# Patient Record
Sex: MALE | Race: WHITE | Employment: OTHER | URBAN - NONMETROPOLITAN AREA
[De-identification: names, ages, dates, MRNs, and addresses within clinical notes are randomized per-mention and may not be internally consistent; named-entity substitution may affect disease eponyms.]

---

## 2022-01-27 ENCOUNTER — INITIAL CONSULT (OUTPATIENT)
Dept: NEUROLOGY | Age: 77
End: 2022-01-27
Payer: MEDICARE

## 2022-01-27 VITALS
SYSTOLIC BLOOD PRESSURE: 128 MMHG | WEIGHT: 250 LBS | HEART RATE: 94 BPM | HEIGHT: 68 IN | BODY MASS INDEX: 37.89 KG/M2 | DIASTOLIC BLOOD PRESSURE: 72 MMHG

## 2022-01-27 DIAGNOSIS — M79.672 FOOT PAIN, BILATERAL: ICD-10-CM

## 2022-01-27 DIAGNOSIS — R20.2 NUMBNESS AND TINGLING OF BOTH FEET: Primary | ICD-10-CM

## 2022-01-27 DIAGNOSIS — M79.671 FOOT PAIN, BILATERAL: ICD-10-CM

## 2022-01-27 DIAGNOSIS — R20.0 NUMBNESS AND TINGLING OF BOTH FEET: Primary | ICD-10-CM

## 2022-01-27 PROCEDURE — 4040F PNEUMOC VAC/ADMIN/RCVD: CPT | Performed by: PSYCHIATRY & NEUROLOGY

## 2022-01-27 PROCEDURE — 99205 OFFICE O/P NEW HI 60 MIN: CPT | Performed by: PSYCHIATRY & NEUROLOGY

## 2022-01-27 PROCEDURE — G8427 DOCREV CUR MEDS BY ELIG CLIN: HCPCS | Performed by: PSYCHIATRY & NEUROLOGY

## 2022-01-27 PROCEDURE — 1036F TOBACCO NON-USER: CPT | Performed by: PSYCHIATRY & NEUROLOGY

## 2022-01-27 PROCEDURE — 3017F COLORECTAL CA SCREEN DOC REV: CPT | Performed by: PSYCHIATRY & NEUROLOGY

## 2022-01-27 PROCEDURE — G8484 FLU IMMUNIZE NO ADMIN: HCPCS | Performed by: PSYCHIATRY & NEUROLOGY

## 2022-01-27 PROCEDURE — 1123F ACP DISCUSS/DSCN MKR DOCD: CPT | Performed by: PSYCHIATRY & NEUROLOGY

## 2022-01-27 PROCEDURE — G8417 CALC BMI ABV UP PARAM F/U: HCPCS | Performed by: PSYCHIATRY & NEUROLOGY

## 2022-01-27 RX ORDER — EMPAGLIFLOZIN 25 MG/1
25 TABLET, FILM COATED ORAL DAILY
COMMUNITY

## 2022-01-27 NOTE — PATIENT INSTRUCTIONS
1. Follow up with your PCP to treat edema prior to EMG study  2. EMG bilateral lower extremitites  3. Vitamin B12, folate  4. Vitamin B6 level  5. Will order connective tissue screen including GA, rheumatoid factor, antidouble-stranded DNA, anti-SSA, anti-SSB in addition to  and sed rate. 6. Call with any new symptoms or concerns. 7. Follow up in 6 weeks.

## 2022-02-02 ENCOUNTER — TELEPHONE (OUTPATIENT)
Dept: NEUROLOGY | Age: 77
End: 2022-02-02

## 2022-02-02 NOTE — TELEPHONE ENCOUNTER
----- Message from TAI Mcarthur CNP sent at 2/2/2022 11:07 AM EST -----  Please let patient know his vitamin B12 level is marginally low=368. He needs to start on vitamin B12 sublingual supplements, at least 3000 mcg daily, over the counter  Please have him call the office in 2-3 months to obtain repeat vitamin B12 level. His rheumatoid factor is also elevated=16. He needs to be evaluated by rheumatology. Does he have a preference?   Per Dutta CNP

## 2022-02-02 NOTE — TELEPHONE ENCOUNTER
Patient notified and verbalized understanding. Patient stated he will call office back with Rheumatology preference.

## 2022-02-11 ENCOUNTER — TELEPHONE (OUTPATIENT)
Dept: NEUROLOGY | Age: 77
End: 2022-02-11

## 2022-02-11 DIAGNOSIS — M79.672 FOOT PAIN, BILATERAL: ICD-10-CM

## 2022-02-11 DIAGNOSIS — R20.2 NUMBNESS AND TINGLING OF BOTH FEET: ICD-10-CM

## 2022-02-11 DIAGNOSIS — M79.671 FOOT PAIN, BILATERAL: ICD-10-CM

## 2022-02-11 DIAGNOSIS — R89.9 ABNORMAL LABORATORY TEST: Primary | ICD-10-CM

## 2022-02-11 DIAGNOSIS — R20.0 NUMBNESS AND TINGLING OF BOTH FEET: ICD-10-CM

## 2022-02-11 NOTE — TELEPHONE ENCOUNTER
Patient called back requesting referral to Dr Turner Saab at phone [phone], Fax [phone]. Please advise. Thank you.

## 2022-02-11 NOTE — PROGRESS NOTES
Chief Complaint   Patient presents with    Consultation     neuropathy       Mary Acevedo is a 77 y.o. male who presents today for evaluation of bilateral leg numbness. He describes the pain as burning sensation. Location of his symptoms involve his bilateral legs and feet up to the mid calf area. He is diabetic for at least the past 10 years, poorly controlled. His symptoms are worse when he is sitting or laying down. He denies back pain. His balance is poor, he has fallen. Last fall was 1 month ago when going up stairs. He feels he has low foot clearance and has to pay attention when going up steps or curbs. He has never been treated with chemotherapy. He does not have high foot arches. He has numbness in his hands. No personal or family history of rheumatologic conditions. He has never had an EMG done. He denies chest pain. No shortness of breath, no neck pain. No vision changes. No dysphagia. No fever. No rash. No weight loss. History provided by patient accompanied by his wife. 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       No Known Allergies    Current Outpatient Medications   Medication Sig Dispense Refill    empagliflozin (JARDIANCE) 25 MG tablet Take 25 mg by mouth daily      simvastatin (ZOCOR) 40 MG tablet Take 40 mg by mouth nightly       NONFORMULARY 2 capsules daily Indications: TriFlex      triamterene-hydroCHLOROthiazide (MAXZIDE-25) 37.5-25 MG per tablet Take 2 tablets by mouth daily       lisinopril (PRINIVIL;ZESTRIL) 40 MG tablet Take 40 mg by mouth daily      metoprolol tartrate (LOPRESSOR) 50 MG tablet Take 50 mg by mouth Daily       metFORMIN (GLUCOPHAGE-XR) 500 MG extended release tablet Take 500 mg by mouth 2 times daily        No current facility-administered medications for this visit. Social History     Socioeconomic History    Marital status:      Spouse name: None    Number of children: None    Years of education: None    Highest education level: None   Occupational History    None   Tobacco Use    Smoking status: Never Smoker    Smokeless tobacco: Never Used   Substance and Sexual Activity    Alcohol use: Yes     Comment: beer    Drug use: No    Sexual activity: None   Other Topics Concern    None   Social History Narrative    None     Social Determinants of Health     Financial Resource Strain:     Difficulty of Paying Living Expenses: Not on file   Food Insecurity:     Worried About Running Out of Food in the Last Year: Not on file    Gerri of Food in the Last Year: Not on file   Transportation Needs:     Lack of Transportation (Medical): Not on file    Lack of Transportation (Non-Medical):  Not on file   Physical Activity:     Days of Exercise per Week: Not on file    Minutes of Exercise per Session: Not on file   Stress:     Feeling of Stress : Not on file   Social Connections:     Frequency of Communication with Friends and Family: Not on file    Frequency of Social Gatherings with Friends and Family: Not on file    Attends Worship Services: Not on file    Active Member of [address] [place] [address] or Organizations: Not on file    Attends Club or Organization Meetings: Not on file    Marital Status: Not on file   Intimate Partner Violence:     Fear of Current or Ex-Partner: Not on file    Emotionally Abused: Not on file    Physically Abused: Not on file    Sexually Abused: Not on file   Housing Stability:     Unable to Pay for Housing in the Last Year: Not on file    Number of [place] in the Last Year: Not on file    Unstable Housing in the Last Year: Not on file       Family History   Problem Relation Age of Onset    Heart Disease Mother     Heart Disease Father     Prostate Cancer Father     Stroke Maternal Grandfather     Cancer Paternal Grandmother     Cancer Paternal Grandfather     Colon Cancer Neg Hx          I reviewed the past medical history, allergies, medications, social history and family history. Review of Systems   All systems reviewed, and are all negative, except what is mentioned in HPI      Vitals:    01/27/22 1451   BP: 128/72   Pulse: 94   Weight: 250 lb (113.4 kg)   Height: 5' 8\" (1.727 m)       Physical Examination:  General appearance - alert, well appearing, and in no distress, oriented to person, place, and time and over weight  Mental status- Level of Alertness: awake  Orientation: person, place, time  Memory: normal  Fund of Knowledge: normal  Attention/Concentration: normal  Language: normal. Mood is normal.   Neck - supple, no significant adenopathy, carotids upstroke normal bilaterally. There is no axillary lymphadenopathy. There is no carotid bruit. No neck lymphadenopathy. No thyroid enlargement   Neurological -   Cranial Dnefwu-XH-UGB:.   Cranial nerve II: Normal   Cranial nerve III: Pupils: equal, round, reactive to light  Cranial nerves III, IV, VI: Extraocular Movements: intact   Cranial nerve V: Facial sensation: intact   Cranial nerve VII:Facial strength: intact   Cranial nerve VIII: Hearing: intact   Cranial nerve IX: Palate Elevation intact bilaterally  Cranial nerve XI: Shoulder shrug intact bilaterally  Cranial nerve XII: Tongue midline   neck supple without rigidity, there is no limitation of range of motion of the neck. DTR's are /decreased distal and symmetric  Babinski sign negative  Romberg  normal  Motor exam is 5/5 in the upper and lower extremities. Normal muscle tone. There is no muscle atrophy.   Sensory is intact for light touch   Coordination: finger to nose,  intact  Gait and station limping   Abnormal movement none, vibration reduced distally  Skin - warm, dry to touch, normal coloration, no rashes, no suspicious skin lesions  Superficial temporal artery pulses are normal. There is no limitation of range of motion of the neck. There is no resting tremor, no pin rolling, no bradykinesia, no Hypohonia, normal blink rate. Musculoskeletal: Has no hand arthritis, no limitation of ROM in any of the four extremities. There is +2 leg edema. The Heart was regular in rate and rhythm. No heart murmur  Chest Clear, with  good effort. Abdomen soft, intact bowel sounds. We reviewed the patient records from referring provider and available information in the EHR       ASSESSMENT:      Diagnosis Orders   1. Numbness and tingling of both feet     2. Foot pain, bilateral        This is a 71-year-old male who presents with bilateral leg numbness. He describes his pain as burning sensation in his symptoms involve his bilateral legs and feet up to the mid calf area. Symptoms could be due to small fiber neuropathy, he has been diabetic for at least the past 10 years, poorly controlled. On exam, there is +2 bilateral leg edema, reduced vibratory sensation distally. The patient was counseled about the symptoms, we suspect his symptoms may be related to diabetic neuropathy. We asked him to follow-up with his PCP to treat his edema, once this is been successfully treated, we will arrange for him to undergo an EMG of his bilateral lower extremities to screen for neuropathy versus radiculopathy. We will also obtain lab work checking vitamin levels and connective tissue diseases. After detailed discussion with the patient and his wife we agreed on the following plan. Plan    1. Follow up with your PCP to treat edema prior to EMG study  2. EMG bilateral lower extremitites  3. Vitamin B12, folate  4. Vitamin B6 level  5. Will order connective tissue screen including GA, rheumatoid factor, antidouble-stranded DNA, anti-SSA, anti-SSB in addition to  and sed rate. 6. Call with any new symptoms or concerns. 7. Follow up in 6 weeks.      Total time 58 min      Kala Haq MD

## 2022-02-24 ENCOUNTER — PROCEDURE VISIT (OUTPATIENT)
Dept: NEUROLOGY | Age: 77
End: 2022-02-24
Payer: MEDICARE

## 2022-02-24 DIAGNOSIS — R29.898 BILATERAL LEG WEAKNESS: ICD-10-CM

## 2022-02-24 DIAGNOSIS — G62.9 NEUROPATHY: ICD-10-CM

## 2022-02-24 DIAGNOSIS — R20.0 BILATERAL LEG NUMBNESS: Primary | ICD-10-CM

## 2022-02-24 DIAGNOSIS — M54.16 LUMBAR RADICULOPATHY: ICD-10-CM

## 2022-02-24 PROCEDURE — 95886 MUSC TEST DONE W/N TEST COMP: CPT | Performed by: PSYCHIATRY & NEUROLOGY

## 2022-02-24 PROCEDURE — 95910 NRV CNDJ TEST 7-8 STUDIES: CPT | Performed by: PSYCHIATRY & NEUROLOGY

## 2022-03-08 ENCOUNTER — HOSPITAL ENCOUNTER (OUTPATIENT)
Dept: MRI IMAGING | Age: 77
Discharge: HOME | End: 2022-03-08
Payer: MEDICARE

## 2022-03-08 DIAGNOSIS — R20.0 BILATERAL LEG NUMBNESS: ICD-10-CM

## 2022-03-08 DIAGNOSIS — G62.9 NEUROPATHY: ICD-10-CM

## 2022-03-08 DIAGNOSIS — R29.898 BILATERAL LEG WEAKNESS: ICD-10-CM

## 2022-03-08 DIAGNOSIS — M54.16 LUMBAR RADICULOPATHY: ICD-10-CM

## 2022-03-08 PROCEDURE — 72148 MRI LUMBAR SPINE W/O DYE: CPT

## 2022-03-14 ENCOUNTER — OFFICE VISIT (OUTPATIENT)
Dept: NEUROLOGY | Age: 77
End: 2022-03-14
Payer: MEDICARE

## 2022-03-14 VITALS
SYSTOLIC BLOOD PRESSURE: 118 MMHG | DIASTOLIC BLOOD PRESSURE: 82 MMHG | HEIGHT: 68 IN | HEART RATE: 105 BPM | BODY MASS INDEX: 38.95 KG/M2 | WEIGHT: 257 LBS

## 2022-03-14 DIAGNOSIS — R20.0 BILATERAL LEG NUMBNESS: Primary | ICD-10-CM

## 2022-03-14 DIAGNOSIS — G62.9 NEUROPATHY: ICD-10-CM

## 2022-03-14 DIAGNOSIS — M54.16 LUMBAR RADICULOPATHY: ICD-10-CM

## 2022-03-14 DIAGNOSIS — M79.672 FOOT PAIN, BILATERAL: ICD-10-CM

## 2022-03-14 DIAGNOSIS — M79.671 FOOT PAIN, BILATERAL: ICD-10-CM

## 2022-03-14 DIAGNOSIS — R29.898 BILATERAL LEG WEAKNESS: ICD-10-CM

## 2022-03-14 PROCEDURE — 1123F ACP DISCUSS/DSCN MKR DOCD: CPT | Performed by: NURSE PRACTITIONER

## 2022-03-14 PROCEDURE — G8484 FLU IMMUNIZE NO ADMIN: HCPCS | Performed by: NURSE PRACTITIONER

## 2022-03-14 PROCEDURE — G8427 DOCREV CUR MEDS BY ELIG CLIN: HCPCS | Performed by: NURSE PRACTITIONER

## 2022-03-14 PROCEDURE — G8417 CALC BMI ABV UP PARAM F/U: HCPCS | Performed by: NURSE PRACTITIONER

## 2022-03-14 PROCEDURE — 99214 OFFICE O/P EST MOD 30 MIN: CPT | Performed by: NURSE PRACTITIONER

## 2022-03-14 PROCEDURE — 1036F TOBACCO NON-USER: CPT | Performed by: NURSE PRACTITIONER

## 2022-03-14 PROCEDURE — 4040F PNEUMOC VAC/ADMIN/RCVD: CPT | Performed by: NURSE PRACTITIONER

## 2022-03-14 RX ORDER — ASPIRIN 81 MG/1
81 TABLET ORAL DAILY
COMMUNITY

## 2022-03-14 NOTE — PROGRESS NOTES
NEUROLOGY OUT PATIENT FOLLOW UP NOTE:  3/14/975135:32 AM    Dennis Hopson is here for follow up for numbness and tingling in bilateral feet, bilateral foot pain. ROS:  Respiratory : no cough, no shortness of breath  Cardiac: no chest pain. No palpitations. Renal : no flank pain, no hematuria    Skin: no rash      No Known Allergies    Current Outpatient Medications:     aspirin 81 MG EC tablet, Take 81 mg by mouth daily, Disp: , Rfl:     empagliflozin (JARDIANCE) 25 MG tablet, Take 25 mg by mouth daily, Disp: , Rfl:     simvastatin (ZOCOR) 40 MG tablet, Take 40 mg by mouth nightly , Disp: , Rfl:     NONFORMULARY, 2 capsules daily Indications: TriFlex, Disp: , Rfl:     triamterene-hydroCHLOROthiazide (MAXZIDE-25) 37.5-25 MG per tablet, Take 2 tablets by mouth daily , Disp: , Rfl:     lisinopril (PRINIVIL;ZESTRIL) 40 MG tablet, Take 40 mg by mouth daily, Disp: , Rfl:     metoprolol tartrate (LOPRESSOR) 50 MG tablet, Take 50 mg by mouth Daily , Disp: , Rfl:     metFORMIN (GLUCOPHAGE-XR) 500 MG extended release tablet, Take 500 mg by mouth 2 times daily , Disp: , Rfl:     I reviewed the past medical history, allergies, medications, social history and family history. PE:   Vitals:    03/14/22 1113   BP: 118/82   Pulse: 105   Weight: 257 lb (116.6 kg)   Height: 5' 8\" (1.727 m)     General Appearance:  awake, alert, oriented, in no acute distress  Gen: NAD, Language is Intact. Skin: no rash, lesion, dry  to touch. warm  Head: no rash, no icterus  Neck: There is no carotid bruits. The Neck is supple. There is no neck lymphadenopathy. Neuro: CN 2-12 grossly intact with no focal deficits. Power 5/5 Throughout symmetric, Reflexes are  symmetric. Long tracts are reduced distally. Cerebellar exam is Intact. Sensory exam is intact to light touch. Gait is intact. Musculoskeletal:  Has no hand arthritis, no limitation of ROM in any of the four extremities.   Lower extremities +1 edema          DATA:             EMG done 2/24/22:      Assessment:     Diagnosis Orders   1. Bilateral leg numbness     2. Neuropathy     3. Lumbar radiculopathy     4. Bilateral leg weakness     5. Foot pain, bilateral          His symptoms are the same. He denies any new symptoms. His balance is good, no falls. He had EMG done of his bilateral lower extremities that showed bilateral chronic L5 radiculopathy worse on the right as well as sensory neuropathy. MRI lumbar spine showed Degenerative changes in the lumbar spine most marked at L2-3, at which level there is mild-to-moderate canal and moderate bilateral foraminal stenosis, left greater than right, at L2-3. Postoperative changes with bilateral pedicular screws at L4 and L5 and changes of laminectomy and fusion at L3 and L4. I offered to refer him to spine specialist, however he is wishing to hold off at this time. He also had lab work done that showed +ve rheumatoid factor. He is scheduled to see rheumatology in May. His vitamin B12 level was also marginally low=368. He is on vitamin B12 sublingual supplements daily. I offered him options of medications to help with his numbness and pain, however he does not feel his sympotms are bad enough to warrant medication at this time. After detailed discussion with patient we agreed on the following plan. Plan:  1. Continue with Vitamin B12 sublingual supplements, 3000 mcg daily   2. Vitamin B12 level  3. Follow through with rheumatology evaluation re: elevated rheumatoid factor  4. Follow up in 3 months or sooner if needed. 5. Call if any questions or concerns.     Total time 30 min    TAI Lewis - CNP

## 2022-04-05 PROBLEM — I10 HYPERTENSION: Status: ACTIVE | Noted: 2022-04-05

## 2022-04-05 PROBLEM — D48.9 NEOPLASM OF UNCERTAIN BEHAVIOR: Status: ACTIVE | Noted: 2022-04-05

## 2022-04-05 PROBLEM — I10 BENIGN ESSENTIAL HYPERTENSION: Status: ACTIVE | Noted: 2022-04-05

## 2022-04-05 PROBLEM — E78.00 HYPERCHOLESTEROLEMIA: Status: ACTIVE | Noted: 2022-04-05

## 2022-04-05 PROBLEM — E11.9 DIABETES MELLITUS (HCC): Status: ACTIVE | Noted: 2022-04-05

## 2022-04-05 PROBLEM — F43.12 CHRONIC POST-TRAUMATIC STRESS DISORDER (PTSD) AFTER MILITARY COMBAT: Status: ACTIVE | Noted: 2022-04-05

## 2022-04-05 PROBLEM — H91.10 PRESBYCUSIS: Status: ACTIVE | Noted: 2022-04-05

## 2022-04-05 PROBLEM — E66.9 OBESITY: Status: ACTIVE | Noted: 2022-04-05

## 2022-04-05 PROBLEM — N40.0 HYPERTROPHY OF PROSTATE WITHOUT URINARY OBSTRUCTION AND OTHER LOWER URINARY TRACT SYMPTOMS (LUTS): Status: ACTIVE | Noted: 2022-04-05

## 2022-04-25 ENCOUNTER — TELEPHONE (OUTPATIENT)
Dept: CARDIOLOGY CLINIC | Age: 77
End: 2022-04-25

## 2022-04-26 ENCOUNTER — OFFICE VISIT (OUTPATIENT)
Dept: CARDIOLOGY CLINIC | Age: 77
End: 2022-04-26
Payer: MEDICARE

## 2022-04-26 VITALS
HEART RATE: 106 BPM | WEIGHT: 269 LBS | HEIGHT: 68 IN | SYSTOLIC BLOOD PRESSURE: 139 MMHG | BODY MASS INDEX: 40.77 KG/M2 | DIASTOLIC BLOOD PRESSURE: 94 MMHG

## 2022-04-26 DIAGNOSIS — R06.02 SHORTNESS OF BREATH: Primary | ICD-10-CM

## 2022-04-26 DIAGNOSIS — E78.01 FAMILIAL HYPERCHOLESTEROLEMIA: ICD-10-CM

## 2022-04-26 DIAGNOSIS — I10 PRIMARY HYPERTENSION: ICD-10-CM

## 2022-04-26 PROCEDURE — G8417 CALC BMI ABV UP PARAM F/U: HCPCS | Performed by: NUCLEAR MEDICINE

## 2022-04-26 PROCEDURE — 99204 OFFICE O/P NEW MOD 45 MIN: CPT | Performed by: NUCLEAR MEDICINE

## 2022-04-26 PROCEDURE — 93000 ELECTROCARDIOGRAM COMPLETE: CPT | Performed by: NUCLEAR MEDICINE

## 2022-04-26 PROCEDURE — 1036F TOBACCO NON-USER: CPT | Performed by: NUCLEAR MEDICINE

## 2022-04-26 PROCEDURE — G8427 DOCREV CUR MEDS BY ELIG CLIN: HCPCS | Performed by: NUCLEAR MEDICINE

## 2022-04-26 PROCEDURE — 4040F PNEUMOC VAC/ADMIN/RCVD: CPT | Performed by: NUCLEAR MEDICINE

## 2022-04-26 PROCEDURE — 1123F ACP DISCUSS/DSCN MKR DOCD: CPT | Performed by: NUCLEAR MEDICINE

## 2022-04-26 RX ORDER — FUROSEMIDE 40 MG/1
40 TABLET ORAL 2 TIMES DAILY
Status: ON HOLD | COMMUNITY
End: 2022-04-27 | Stop reason: HOSPADM

## 2022-04-26 ASSESSMENT — ENCOUNTER SYMPTOMS
SHORTNESS OF BREATH: 1
COLOR CHANGE: 0
ABDOMINAL DISTENTION: 0
VOMITING: 0
PHOTOPHOBIA: 0
CONSTIPATION: 0
NAUSEA: 0
BACK PAIN: 0
CHEST TIGHTNESS: 0
DIARRHEA: 0
ABDOMINAL PAIN: 0
RECTAL PAIN: 0
BLOOD IN STOOL: 0
ANAL BLEEDING: 0

## 2022-04-26 NOTE — H&P
[facility]  Sedation/Analgesia History & Physical    Pt Name: Emily Victoria  Account number:   MRN: [[id number]]  YOB: 1945  Provider Performing Procedure: MD MD Ryann Stewart  Primary Care Physician: Zena Cox MD  Date: 4/26/2022    PRE-PROCEDURE    Code Status: FULL CODE  Brief History/Pre-Procedure Diagnosis:   Angina   CHF      Consent: : I have discussed with the patient risks, benefits, and alternatives (and relevant risks, benefits, and side effects related to alternatives or not receiving care), and likelihood of the success. The patient and/or representative appear to understand and agree to proceed. The discussion encompasses risks, benefits, and side effects related to the alternatives and the risks related to not receiving the proposed care, treatment, and services. 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 SURGICAL HISTORY   has a past surgical history that includes Upper gastrointestinal endoscopy (2007); back surgery (2007); Colonoscopy (2007, 2016,2019,2020); EGD (2007); skin biopsy (Left); and Arm Surgery (Left).   Additional information:       ALLERGIES   Allergies as of 04/27/2022    (No Known Allergies)     Additional information:       MEDICATIONS   Aspirin  [x] 81 mg  [] 325 mg  [] None  Antiplatelet drug therapy use last 5 days  []No []Yes  Coumadin Use Last 5 Days []No []Yes  Other anticoagulant use last 5 days  []No []Yes    Current Outpatient Medications:     furosemide (LASIX) 40 MG tablet, Take 40 mg by mouth 2 times daily, Disp: , Rfl:     Potassium (POTASSIMIN PO), Take 8 mEq by mouth, Disp: , Rfl:     triamterene-hydroCHLOROthiazide (MAXZIDE) 75-50 MG per tablet, Take 1 tablet by mouth Daily with supper 2 cps twice daily, Disp: , Rfl:     aspirin 81 MG EC tablet, Take 81 mg by mouth daily, Disp: , Rfl:     empagliflozin (JARDIANCE) 25 MG tablet, Take 25 mg by mouth daily, Disp: , Rfl:     simvastatin (ZOCOR) 40 MG tablet, Take 40 mg by mouth nightly , Disp: , Rfl:     NONFORMULARY, 2 capsules daily Indications: TriFlex, Disp: , Rfl:     lisinopril (PRINIVIL;ZESTRIL) 40 MG tablet, Take 40 mg by mouth daily, Disp: , Rfl:     metoprolol tartrate (LOPRESSOR) 50 MG tablet, Take 50 mg by mouth Daily , Disp: , Rfl:     metFORMIN (GLUCOPHAGE-XR) 500 MG extended release tablet, Take 500 mg by mouth in the morning, at noon, and at bedtime , Disp: , Rfl:   Prior to Admission medications    Medication Sig Start Date End Date Taking?  Authorizing Provider   furosemide (LASIX) 40 MG tablet Take 40 mg by mouth 2 times daily    Historical Provider, MD   Potassium (POTASSIMIN PO) Take 8 mEq by mouth    Historical Provider, MD   triamterene-hydroCHLOROthiazide (MAXZIDE) 75-50 MG per tablet Take 1 tablet by mouth Daily with supper 2 cps twice daily    Historical Provider, MD   aspirin 81 MG EC tablet Take 81 mg by mouth daily    Historical Provider, MD   empagliflozin (JARDIANCE) 25 MG tablet Take 25 mg by mouth daily    Historical Provider, MD   simvastatin (ZOCOR) 40 MG tablet Take 40 mg by mouth nightly     Historical Provider, MD   NONFORMULARY 2 capsules daily Indications: TriFlex    Historical Provider, MD   lisinopril (PRINIVIL;ZESTRIL) 40 MG tablet Take 40 mg by mouth daily    Historical Provider, MD   metoprolol tartrate (LOPRESSOR) 50 MG tablet Take 50 mg by mouth Daily     Historical Provider, MD   metFORMIN (GLUCOPHAGE-XR) 500 MG extended release tablet Take 500 mg by mouth in the morning, at noon, and at bedtime     Historical Provider, MD     Additional information:       VITAL SIGNS   There were no vitals filed for this visit.    PHYSICAL:   General: No acute distress  HEENT:  Unremarkable for age  Neck: without increased JVD, carotid pulses 2+ bilaterally without bruits  Heart: RRR, S1 & S2 WNL, S4 gallop, without murmurs or rubs    Lungs: Clear to auscultation    Abdomen: BS present, without HSM, masses, or tenderness    Extremities: without C,C,E.  Pulses 2+ bilaterally  Mental Status: Alert & Oriented        PLANNED PROCEDURE   [x]Cath  []PCI                []Pacemaker/AICD  []DORINDA             []Cardioversion []Peripheral angiography/PTA  []Other:      SEDATION  Planned agent:[x]Midazolam []Meperidine [x]Sublimaze []Morphine  []Diazepam  []Other:     ASA Classification:  []1 [x]2 []3 []4 []5  Class 1: A normal healthy patient  Class 2: Pt with mild to moderate systemic disease  Class 3: Severe systemic disease or disturbance  Class 4: Severe systemic disorders that are already life threatening. Class 5: Moribund pt with little chances of survival, for more than 24 hours. Mallampati I Airway Classification:   []1 [x]2 []3 []4    [x]Pre-procedure diagnostic studies complete and results available. Comment:    [x]Previous sedation/anesthesia experiences assessed. Comment:    [x]The patient is an appropriate candidate to undergo the planned procedure sedation and anesthesia. (Refer to nursing sedation/analgesia documentation record)  [x]Formulation and discussion of sedation/procedure plan, risks, and expectations with patient and/or responsible adult completed. [x]Patient examined immediately prior to the procedure.  (Refer to nursing sedation/analgesia documentation record)    Krystal Shultz MD MD [address]  Electronically signed 4/26/2022 at 6:06 PM

## 2022-04-26 NOTE — PROGRESS NOTES
[address] [address] [address]  [address] [facility],4Th Floor [address] [facility]  Dept: [phone]  Dept Fax: [phone]  Loc: [phone]    Visit Date: 2022    Madhu Maya is a 69 y.o. male who presents todayfor:  Chief Complaint   Patient presents with    New Patient     SOB, SWELLING from abdomen down     Diabetes    Hypertension    Hyperlipidemia     Here for the first time  Looks like had more dyspnea  More than usual   Progressive in nature  Worse dyspnea and leg edema  Weight gain   Not responding to diuretics  Does have DM for 10 years   Does have HTN and hyperlipidemia  On medical RX   Doing well   Possible sleep apnea  No smoking   Family history of CAD     HPI:  HPI  Past Medical History:   Diagnosis Date    Anemia     Diabetes ([address]     History of colonic polyps     Hyperlipidemia     Hypertension     Osteoarthritis     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, left       Past Surgical History:   Procedure Laterality Date    ARM SURGERY Left     cancer removal squamous    BACK SURGERY      COLONOSCOPY  , 2016,2019,2020    EGD      SKIN BIOPSY Left     UPPER GASTROINTESTINAL ENDOSCOPY       Family History   Problem Relation Age of Onset    Heart Disease Mother     Heart Disease Father     Prostate Cancer Father     Stroke Maternal Grandfather     Cancer Paternal Grandmother     Cancer Paternal Grandfather     Colon Cancer Neg Hx      Social History     Tobacco Use    Smoking status: Former Smoker     Quit date: 1970     Years since quittin.3    Smokeless tobacco: Never Used   Substance Use Topics    Alcohol use: Not Currently      Current Outpatient Medications   Medication Sig Dispense Refill    furosemide (LASIX) 40 MG tablet Take 40 mg by mouth 2 times daily      Potassium (POTASSIMIN PO) Take 8 mEq by mouth      triamterene-hydroCHLOROthiazide (MAXZIDE) 75-50 MG per tablet Take 1 tablet by mouth Daily with supper 2 cps twice daily      aspirin 81 MG EC tablet Take 81 mg by mouth daily      empagliflozin (JARDIANCE) 25 MG tablet Take 25 mg by mouth daily      simvastatin (ZOCOR) 40 MG tablet Take 40 mg by mouth nightly       NONFORMULARY 2 capsules daily Indications: TriFlex      lisinopril (PRINIVIL;ZESTRIL) 40 MG tablet Take 40 mg by mouth daily      metoprolol tartrate (LOPRESSOR) 50 MG tablet Take 50 mg by mouth Daily       metFORMIN (GLUCOPHAGE-XR) 500 MG extended release tablet Take 500 mg by mouth in the morning, at noon, and at bedtime        No current facility-administered medications for this visit. No Known Allergies  Health Maintenance   Topic Date Due    COVID-19 Vaccine (1) Never done    Lipids  Never done    Depression Screen  Never done    Potassium  Never done    Creatinine  Never done    Hepatitis C screen  Never done    Shingles Vaccine (2 of 3) 02/21/2013    Pneumococcal 65+ years Vaccine (2 - PPSV23 or PCV20) 11/23/2016    Annual Wellness Visit (AWV)  Never done    Flu vaccine (Season Ended) 09/01/2022    DTaP/Tdap/Td vaccine (2 - Td or Tdap) 08/04/2024    Hepatitis A vaccine  Aged Out    Hib vaccine  Aged Out    Meningococcal (ACWY) vaccine  Aged Out       Subjective:  Review of Systems   Constitutional: Positive for fatigue. HENT: Negative for ear pain and mouth sores. Eyes: Negative for photophobia. Respiratory: Positive for shortness of breath. Negative for chest tightness. Cardiovascular: Negative for chest pain and palpitations. Gastrointestinal: Negative for abdominal distention, abdominal pain, anal bleeding, blood in stool, constipation, diarrhea, nausea, rectal pain and vomiting. Endocrine: Negative for polyphagia. Genitourinary: Negative for dysuria, hematuria and urgency. Musculoskeletal: Negative for arthralgias, back pain, gait problem, joint swelling, myalgias, neck pain and neck stiffness.    Skin: Negative for color change, pallor, rash and wound. Allergic/Immunologic: Negative for food allergies. Neurological: Negative for dizziness and light-headedness. Psychiatric/Behavioral: Negative for behavioral problems, confusion, decreased concentration, dysphoric mood and hallucinations. The patient is not nervous/anxious and is not hyperactive. Objective:  Physical Exam  Constitutional:       Appearance: He is normal weight. HENT:      Right Ear: Tympanic membrane, ear canal and external ear normal.      Nose: Nose normal.      Mouth/Throat:      Mouth: Mucous membranes are moist.      Pharynx: Oropharynx is clear. Eyes:      Conjunctiva/sclera: Conjunctivae normal.      Pupils: Pupils are equal, round, and reactive to light. Cardiovascular:      Rate and Rhythm: Normal rate and regular rhythm. Heart sounds: Murmur heard. No gallop. Pulmonary:      Effort: No respiratory distress. Breath sounds: No stridor. No wheezing, rhonchi or rales. Chest:      Chest wall: No tenderness. Abdominal:      General: There is no distension. Palpations: There is no mass. Tenderness: There is no abdominal tenderness. There is no right CVA tenderness, left CVA tenderness, guarding or rebound. Hernia: No hernia is present. Musculoskeletal:         General: No swelling, tenderness, deformity or signs of injury. Right lower leg: No edema. Left lower leg: No edema. Skin:     Coloration: Skin is not jaundiced or pale. Findings: No bruising, erythema, lesion or rash. Neurological:      Mental Status: He is alert and oriented to person, place, and time. Cranial Nerves: No cranial nerve deficit. Sensory: No sensory deficit. Motor: No weakness. Coordination: Coordination normal.      Gait: Gait normal.      Deep Tendon Reflexes: Reflexes normal.   Psychiatric:         Mood and Affect: Mood normal.         Thought Content:  Thought content normal.       BP (!) 139/94 Pulse 106   Ht 5' 8\" (1.727 m)   Wt 269 lb (122 kg)   BMI 40.90 kg/m²     Assessment:      Diagnosis Orders   1. Shortness of breath     2. Primary hypertension     3. Familial hypercholesterolemia     as abbve  Concerning for CHF   Vs sleep apnea  Not responding to medical RX   ECG in office was done today. I reviewed the ECG. New A fib rate controlled         Plan:  No follow-ups on file. Discussed  Needs an echo   Might need a cath soon   Likely underlying CAD   New A fib   Discussed NOACs at length   After the cath   Continue risk factor modification and medical management  Thank you for allowing me to participate in the care of your patient. Please don't hesitate to contact me regarding any further issues related to the patient care    Orders Placed:  No orders of the defined types were placed in this encounter. Medications Prescribed:  No orders of the defined types were placed in this encounter. Discussed use, benefit, and side effects of prescribed medications. All patient questions answered. Pt voicedunderstanding. Instructed to continue current medications, diet and exercise. Continue risk factor modification and medical management. Patient agreed with treatment plan. Follow up as directed.     Electronically signedby Nila Last MD on 4/26/2022 at 1:55 PM

## 2022-04-27 ENCOUNTER — TELEPHONE (OUTPATIENT)
Dept: CARDIOLOGY CLINIC | Age: 77
End: 2022-04-27

## 2022-04-27 ENCOUNTER — HOSPITAL ENCOUNTER (OUTPATIENT)
Dept: INPATIENT UNIT | Age: 77
Discharge: HOME | End: 2022-04-27
Attending: NUCLEAR MEDICINE | Admitting: NUCLEAR MEDICINE
Payer: MEDICARE

## 2022-04-27 VITALS
RESPIRATION RATE: 24 BRPM | HEIGHT: 68 IN | BODY MASS INDEX: 40.77 KG/M2 | WEIGHT: 269 LBS | HEART RATE: 90 BPM | DIASTOLIC BLOOD PRESSURE: 92 MMHG | OXYGEN SATURATION: 92 % | SYSTOLIC BLOOD PRESSURE: 132 MMHG | TEMPERATURE: 97.5 F

## 2022-04-27 DIAGNOSIS — R06.02 SHORTNESS OF BREATH: ICD-10-CM

## 2022-04-27 DIAGNOSIS — I48.19 ATRIAL FIBRILLATION, PERSISTENT (HCC): ICD-10-CM

## 2022-04-27 DIAGNOSIS — I10 PRIMARY HYPERTENSION: ICD-10-CM

## 2022-04-27 DIAGNOSIS — R93.1 ABNORMAL ECHOCARDIOGRAM: Primary | ICD-10-CM

## 2022-04-27 DIAGNOSIS — I42.9 CARDIOMYOPATHY, UNSPECIFIED TYPE (HCC): ICD-10-CM

## 2022-04-27 LAB
ABO: NORMAL
ANION GAP SERPL CALCULATED.3IONS-SCNC: 14 MEQ/L (ref 8–16)
ANTIBODY SCREEN: NORMAL
APTT: 31.5 SECONDS (ref 22–38)
BUN BLDV-MCNC: 22 MG/DL (ref 7–22)
CALCIUM SERPL-MCNC: 9.2 MG/DL (ref 8.5–10.5)
CHLORIDE BLD-SCNC: 104 MEQ/L (ref 98–111)
CO2: 27 MEQ/L (ref 23–33)
CREAT SERPL-MCNC: 1 MG/DL (ref 0.4–1.2)
EKG Q-T INTERVAL: 426 MS
EKG QRS DURATION: 92 MS
EKG QTC CALCULATION (BAZETT): 532 MS
EKG R AXIS: 98 DEGREES
EKG T AXIS: 139 DEGREES
EKG VENTRICULAR RATE: 94 BPM
ERYTHROCYTE [DISTWIDTH] IN BLOOD BY AUTOMATED COUNT: 14.6 % (ref 11.5–14.5)
ERYTHROCYTE [DISTWIDTH] IN BLOOD BY AUTOMATED COUNT: 49.5 FL (ref 35–45)
GFR SERPL CREATININE-BSD FRML MDRD: 73 ML/MIN/1.73M2
GLUCOSE BLD-MCNC: 175 MG/DL (ref 70–108)
HCT VFR BLD CALC: 52.3 % (ref 42–52)
HEMOGLOBIN: 16.5 GM/DL (ref 14–18)
INR BLD: 1.5 (ref 0.85–1.13)
LV EF: 28 %
LVEF MODALITY: NORMAL
MCH RBC QN AUTO: 29.6 PG (ref 26–33)
MCHC RBC AUTO-ENTMCNC: 31.5 GM/DL (ref 32.2–35.5)
MCV RBC AUTO: 93.7 FL (ref 80–94)
PLATELET # BLD: 165 THOU/MM3 (ref 130–400)
PMV BLD AUTO: 9.9 FL (ref 9.4–12.4)
POTASSIUM SERPL-SCNC: 3.3 MEQ/L (ref 3.5–5.2)
RBC # BLD: 5.58 MILL/MM3 (ref 4.7–6.1)
RH FACTOR: NORMAL
SODIUM BLD-SCNC: 145 MEQ/L (ref 135–145)
WBC # BLD: 5.7 THOU/MM3 (ref 4.8–10.8)

## 2022-04-27 PROCEDURE — 85730 THROMBOPLASTIN TIME PARTIAL: CPT

## 2022-04-27 PROCEDURE — 93306 TTE W/DOPPLER COMPLETE: CPT

## 2022-04-27 PROCEDURE — 36415 COLL VENOUS BLD VENIPUNCTURE: CPT

## 2022-04-27 PROCEDURE — 93005 ELECTROCARDIOGRAM TRACING: CPT | Performed by: NUCLEAR MEDICINE

## 2022-04-27 PROCEDURE — 85610 PROTHROMBIN TIME: CPT

## 2022-04-27 PROCEDURE — 2580000003 HC RX 258: Performed by: NUCLEAR MEDICINE

## 2022-04-27 PROCEDURE — 96366 THER/PROPH/DIAG IV INF ADDON: CPT

## 2022-04-27 PROCEDURE — 6360000002 HC RX W HCPCS: Performed by: NUCLEAR MEDICINE

## 2022-04-27 PROCEDURE — 93010 ELECTROCARDIOGRAM REPORT: CPT | Performed by: INTERNAL MEDICINE

## 2022-04-27 PROCEDURE — C1894 INTRO/SHEATH, NON-LASER: HCPCS

## 2022-04-27 PROCEDURE — 6360000002 HC RX W HCPCS

## 2022-04-27 PROCEDURE — 96365 THER/PROPH/DIAG IV INF INIT: CPT

## 2022-04-27 PROCEDURE — 80048 BASIC METABOLIC PNL TOTAL CA: CPT

## 2022-04-27 PROCEDURE — 93460 R&L HRT ART/VENTRICLE ANGIO: CPT

## 2022-04-27 PROCEDURE — 86850 RBC ANTIBODY SCREEN: CPT

## 2022-04-27 PROCEDURE — C1887 CATHETER, GUIDING: HCPCS

## 2022-04-27 PROCEDURE — C1769 GUIDE WIRE: HCPCS

## 2022-04-27 PROCEDURE — 2500000003 HC RX 250 WO HCPCS

## 2022-04-27 PROCEDURE — 86900 BLOOD TYPING SEROLOGIC ABO: CPT

## 2022-04-27 PROCEDURE — 85027 COMPLETE CBC AUTOMATED: CPT

## 2022-04-27 PROCEDURE — 86901 BLOOD TYPING SEROLOGIC RH(D): CPT

## 2022-04-27 PROCEDURE — 93460 R&L HRT ART/VENTRICLE ANGIO: CPT | Performed by: NUCLEAR MEDICINE

## 2022-04-27 RX ORDER — POTASSIUM CHLORIDE 20 MEQ/1
20 TABLET, EXTENDED RELEASE ORAL 2 TIMES DAILY
Qty: 60 TABLET | Refills: 11 | Status: SHIPPED | OUTPATIENT
Start: 2022-04-27 | End: 2022-04-27 | Stop reason: SDUPTHER

## 2022-04-27 RX ORDER — ATROPINE SULFATE 0.4 MG/ML
0.5 AMPUL (ML) INJECTION
Status: DISCONTINUED | OUTPATIENT
Start: 2022-04-27 | End: 2022-04-27 | Stop reason: HOSPADM

## 2022-04-27 RX ORDER — BUMETANIDE 2 MG/1
2 TABLET ORAL 2 TIMES DAILY
Qty: 60 TABLET | Refills: 11 | Status: SHIPPED | OUTPATIENT
Start: 2022-04-27 | End: 2022-05-10 | Stop reason: SDUPTHER

## 2022-04-27 RX ORDER — SODIUM CHLORIDE 0.9 % (FLUSH) 0.9 %
5-40 SYRINGE (ML) INJECTION PRN
Status: DISCONTINUED | OUTPATIENT
Start: 2022-04-27 | End: 2022-04-27 | Stop reason: HOSPADM

## 2022-04-27 RX ORDER — DIPHENHYDRAMINE HCL 25 MG
50 TABLET ORAL ONCE
Status: DISCONTINUED | OUTPATIENT
Start: 2022-04-27 | End: 2022-04-27 | Stop reason: HOSPADM

## 2022-04-27 RX ORDER — SODIUM CHLORIDE 0.9 % (FLUSH) 0.9 %
5-40 SYRINGE (ML) INJECTION EVERY 12 HOURS SCHEDULED
Status: DISCONTINUED | OUTPATIENT
Start: 2022-04-27 | End: 2022-04-27 | Stop reason: HOSPADM

## 2022-04-27 RX ORDER — ACETAMINOPHEN 325 MG/1
650 TABLET ORAL EVERY 4 HOURS PRN
Status: DISCONTINUED | OUTPATIENT
Start: 2022-04-27 | End: 2022-04-27 | Stop reason: HOSPADM

## 2022-04-27 RX ORDER — CARVEDILOL 6.25 MG/1
6.25 TABLET ORAL 2 TIMES DAILY
Qty: 60 TABLET | Refills: 11 | Status: SHIPPED | OUTPATIENT
Start: 2022-04-27 | End: 2022-05-10 | Stop reason: SDUPTHER

## 2022-04-27 RX ORDER — SODIUM CHLORIDE 9 MG/ML
INJECTION, SOLUTION INTRAVENOUS PRN
Status: DISCONTINUED | OUTPATIENT
Start: 2022-04-27 | End: 2022-04-27 | Stop reason: SDUPTHER

## 2022-04-27 RX ORDER — ASPIRIN 325 MG
325 TABLET ORAL ONCE
Status: DISCONTINUED | OUTPATIENT
Start: 2022-04-27 | End: 2022-04-27 | Stop reason: HOSPADM

## 2022-04-27 RX ORDER — CARVEDILOL 6.25 MG/1
6.25 TABLET ORAL 2 TIMES DAILY
Qty: 60 TABLET | Refills: 11 | Status: SHIPPED | OUTPATIENT
Start: 2022-04-27 | End: 2022-04-27 | Stop reason: SDUPTHER

## 2022-04-27 RX ORDER — NITROGLYCERIN 0.4 MG/1
0.4 TABLET SUBLINGUAL EVERY 5 MIN PRN
Status: DISCONTINUED | OUTPATIENT
Start: 2022-04-27 | End: 2022-04-27 | Stop reason: HOSPADM

## 2022-04-27 RX ORDER — SODIUM CHLORIDE 9 MG/ML
INJECTION, SOLUTION INTRAVENOUS CONTINUOUS
Status: DISCONTINUED | OUTPATIENT
Start: 2022-04-27 | End: 2022-04-27 | Stop reason: HOSPADM

## 2022-04-27 RX ORDER — SODIUM CHLORIDE 0.9 % (FLUSH) 0.9 %
5-40 SYRINGE (ML) INJECTION PRN
Status: DISCONTINUED | OUTPATIENT
Start: 2022-04-27 | End: 2022-04-27 | Stop reason: SDUPTHER

## 2022-04-27 RX ORDER — POTASSIUM CHLORIDE 20 MEQ/1
20 TABLET, EXTENDED RELEASE ORAL 2 TIMES DAILY
Qty: 60 TABLET | Refills: 11 | Status: SHIPPED | OUTPATIENT
Start: 2022-04-27 | End: 2022-05-10 | Stop reason: SDUPTHER

## 2022-04-27 RX ORDER — SODIUM CHLORIDE 0.9 % (FLUSH) 0.9 %
5-40 SYRINGE (ML) INJECTION EVERY 12 HOURS SCHEDULED
Status: DISCONTINUED | OUTPATIENT
Start: 2022-04-27 | End: 2022-04-27 | Stop reason: SDUPTHER

## 2022-04-27 RX ORDER — BUMETANIDE 2 MG/1
2 TABLET ORAL 2 TIMES DAILY
Qty: 60 TABLET | Refills: 11 | Status: SHIPPED | OUTPATIENT
Start: 2022-04-27 | End: 2022-04-27 | Stop reason: SDUPTHER

## 2022-04-27 RX ORDER — SODIUM CHLORIDE 9 MG/ML
INJECTION, SOLUTION INTRAVENOUS PRN
Status: DISCONTINUED | OUTPATIENT
Start: 2022-04-27 | End: 2022-04-27 | Stop reason: HOSPADM

## 2022-04-27 RX ORDER — POTASSIUM CHLORIDE 7.45 MG/ML
10 INJECTION INTRAVENOUS
Status: COMPLETED | OUTPATIENT
Start: 2022-04-27 | End: 2022-04-27

## 2022-04-27 RX ADMIN — POTASSIUM CHLORIDE 10 MEQ: 7.46 INJECTION, SOLUTION INTRAVENOUS at 08:20

## 2022-04-27 RX ADMIN — SODIUM CHLORIDE: 9 INJECTION, SOLUTION INTRAVENOUS at 06:12

## 2022-04-27 RX ADMIN — POTASSIUM CHLORIDE 10 MEQ: 7.46 INJECTION, SOLUTION INTRAVENOUS at 10:00

## 2022-04-27 NOTE — PROGRESS NOTES
Discharge teaching and instructions completed with patient and wife using teachback method. AVS reviewed. Prescriptions escribed to patient's pharmacy. Samples of new medications brought to room by Dr. Paola york. Patient and wife voiced understanding regarding prescriptions, follow up appointments, and care of self at home. Discharged in a wheelchair to home with support per wife.

## 2022-04-27 NOTE — PROCEDURES
[address] [place] [address]                            CARDIAC CATHETERIZATION    PATIENT NAME: Jamison Shaw                        :        1945  MED REC NO:   [id number]                           ROOM:       0007  ACCOUNT NO:   [de-identified]                           ADMIT DATE: 2022  PROVIDER:     Joby Khan M.D.    DATE OF PROCEDURE:  2022    CLINICAL HISTORY AND INDICATION:  This is a pleasant gentleman who is 69 years of age who comes in with new-onset congestive heart failure, fluid  retention, and referred for right and left heart catheterization due to  poor response to diuretics and significant risk factors for coronary  artery disease. PROCEDURES:  1. Left heart cath with left ventriculogram.  2.  Right heart catheterization. 3.  Coronary angiogram, right and left. 4.  Sedation, 2 of Versed, 50 of fentanyl between 7 and 8 a.m. in my  presence under my supervision. 5.  Blood loss less than 10 mL. PROCEDURE DETAILS:  Please refer to my catheterization detailed note. HEMODYNAMIC RESULTS:  LEFT VENTRICULOGRAM:  Left ventricular end-diastolic pressure was 27  mmHg with no significant change before and after contrast injection. No  significant gradient across the aortic valve to signify aortic stenosis. Left ventricular function was abnormal with dilated left ventricle, EF  of 25%. RIGHT HEART CATHETERIZATION:  Right heart catheterization revealed  elevated right ventricular pressure of 55 mmHg. Pulmonary arterial  pressure was difficult to obtain due to enlargement of the right  ventricle and difficulty getting the [place] to advance to the  pulmonary artery. CORONARY ARTERIOGRAM RESULTS:  1. Left main is patent, gives rise to left anterior descending and left  circumflex artery. 2.  Left anterior descending artery has minimal luminal irregularity.   3.  Left circumflex artery is codominant and patent. 4.  Right coronary artery is codominant, has nonobstructive 30 to 40%  proximal stenosis. CONCLUSION:  1. Mild nonobstructive coronary disease. 2.  Cardiomyopathy, likely explaining the patient's symptoms. 3.  Elevated filling pressures. 4.  No complications. At this point, we will continue with diuresis. We will add beta blocker  and ACE inhibitors. We will consider Entresto and will plan on a  LifeVest and go from there. The patient will need DORINDA cardioversion  next week. We will discuss treatment plan with the family.         Quinn Parisi M.D.    D: 04/27/2022 9:26:17       T: 04/27/2022 9:28:41     [email or web address]  Job#: [id number]     Doc#: [id number]    CC:

## 2022-04-27 NOTE — TELEPHONE ENCOUNTER
VO from Dr. Dorothy Song   Stop lasix, metoprolol, and lisinopril     Start   entresto 24/26 BID  Coreg 6.25BID   bumex 2mg BID  k dur 20meq BID  eliquis 5 mg BID     Set pt up for DORINDA/CV and lifevest   Please agree? ??     Orders placed meds pended   Spoke with pt in 2E he voiced understanding   rx pended    spoke with zoll to set up life vest

## 2022-04-28 NOTE — TELEPHONE ENCOUNTER
Jak/CV scheduled for Monday, 5/02/22 at 4:00 pm, with arrival time of 2:00 pm. All instructions reviewed via phone with patient, patient verbalized understanding. Case scheduled with Christopher Fallon in cath lab.

## 2022-04-29 ENCOUNTER — PREP FOR PROCEDURE (OUTPATIENT)
Dept: CARDIOLOGY | Age: 77
End: 2022-04-29

## 2022-04-29 RX ORDER — SODIUM CHLORIDE 0.9 % (FLUSH) 0.9 %
5-40 SYRINGE (ML) INJECTION PRN
Status: CANCELLED | OUTPATIENT
Start: 2022-04-29

## 2022-04-29 RX ORDER — SODIUM CHLORIDE 0.9 % (FLUSH) 0.9 %
5-40 SYRINGE (ML) INJECTION EVERY 12 HOURS SCHEDULED
Status: CANCELLED | OUTPATIENT
Start: 2022-04-29

## 2022-04-29 RX ORDER — SODIUM CHLORIDE 9 MG/ML
INJECTION, SOLUTION INTRAVENOUS PRN
Status: CANCELLED | OUTPATIENT
Start: 2022-04-29

## 2022-05-02 ENCOUNTER — HOSPITAL ENCOUNTER (OUTPATIENT)
Dept: INPATIENT UNIT | Age: 77
Discharge: HOME | End: 2022-05-02
Attending: NUCLEAR MEDICINE | Admitting: NUCLEAR MEDICINE
Payer: MEDICARE

## 2022-05-02 VITALS
DIASTOLIC BLOOD PRESSURE: 90 MMHG | WEIGHT: 254 LBS | OXYGEN SATURATION: 99 % | RESPIRATION RATE: 17 BRPM | BODY MASS INDEX: 38.49 KG/M2 | HEIGHT: 68 IN | TEMPERATURE: 97.7 F | HEART RATE: 73 BPM | SYSTOLIC BLOOD PRESSURE: 134 MMHG

## 2022-05-02 DIAGNOSIS — I48.19 ATRIAL FIBRILLATION, PERSISTENT (HCC): ICD-10-CM

## 2022-05-02 DIAGNOSIS — I42.9 CARDIOMYOPATHY, UNSPECIFIED TYPE (HCC): ICD-10-CM

## 2022-05-02 DIAGNOSIS — R93.1 ABNORMAL ECHOCARDIOGRAM: ICD-10-CM

## 2022-05-02 DIAGNOSIS — R06.02 SHORTNESS OF BREATH: ICD-10-CM

## 2022-05-02 DIAGNOSIS — I10 PRIMARY HYPERTENSION: ICD-10-CM

## 2022-05-02 LAB
EKG ATRIAL RATE: 92 BPM
EKG P AXIS: 65 DEGREES
EKG P-R INTERVAL: 202 MS
EKG Q-T INTERVAL: 364 MS
EKG Q-T INTERVAL: 420 MS
EKG QRS DURATION: 106 MS
EKG QRS DURATION: 116 MS
EKG QTC CALCULATION (BAZETT): 514 MS
EKG QTC CALCULATION (BAZETT): 519 MS
EKG R AXIS: 110 DEGREES
EKG R AXIS: 110 DEGREES
EKG T AXIS: -52 DEGREES
EKG T AXIS: 78 DEGREES
EKG VENTRICULAR RATE: 120 BPM
EKG VENTRICULAR RATE: 92 BPM
LV EF: 30 %
LVEF MODALITY: NORMAL

## 2022-05-02 PROCEDURE — 93005 ELECTROCARDIOGRAM TRACING: CPT | Performed by: NUCLEAR MEDICINE

## 2022-05-02 PROCEDURE — 6370000000 HC RX 637 (ALT 250 FOR IP)

## 2022-05-02 PROCEDURE — 2580000003 HC RX 258: Performed by: STUDENT IN AN ORGANIZED HEALTH CARE EDUCATION/TRAINING PROGRAM

## 2022-05-02 PROCEDURE — 93320 DOPPLER ECHO COMPLETE: CPT

## 2022-05-02 PROCEDURE — 2500000003 HC RX 250 WO HCPCS

## 2022-05-02 PROCEDURE — 93005 ELECTROCARDIOGRAM TRACING: CPT | Performed by: STUDENT IN AN ORGANIZED HEALTH CARE EDUCATION/TRAINING PROGRAM

## 2022-05-02 PROCEDURE — 6360000002 HC RX W HCPCS

## 2022-05-02 PROCEDURE — 92960 CARDIOVERSION ELECTRIC EXT: CPT | Performed by: NUCLEAR MEDICINE

## 2022-05-02 PROCEDURE — 93325 DOPPLER ECHO COLOR FLOW MAPG: CPT

## 2022-05-02 PROCEDURE — 93312 ECHO TRANSESOPHAGEAL: CPT

## 2022-05-02 PROCEDURE — 92960 CARDIOVERSION ELECTRIC EXT: CPT

## 2022-05-02 RX ORDER — SODIUM CHLORIDE 0.9 % (FLUSH) 0.9 %
5-40 SYRINGE (ML) INJECTION EVERY 12 HOURS SCHEDULED
Status: DISCONTINUED | OUTPATIENT
Start: 2022-05-02 | End: 2022-05-02 | Stop reason: HOSPADM

## 2022-05-02 RX ORDER — SODIUM CHLORIDE 0.9 % (FLUSH) 0.9 %
5-40 SYRINGE (ML) INJECTION PRN
Status: DISCONTINUED | OUTPATIENT
Start: 2022-05-02 | End: 2022-05-02 | Stop reason: HOSPADM

## 2022-05-02 RX ORDER — SODIUM CHLORIDE 9 MG/ML
INJECTION, SOLUTION INTRAVENOUS PRN
Status: DISCONTINUED | OUTPATIENT
Start: 2022-05-02 | End: 2022-05-02 | Stop reason: HOSPADM

## 2022-05-02 RX ADMIN — SODIUM CHLORIDE: 9 INJECTION, SOLUTION INTRAVENOUS at 12:52

## 2022-05-02 NOTE — PROCEDURES
[address] [place] [address]                            CARDIAC CATHETERIZATION    PATIENT NAME: Jamison Shaw                        :        1945  MED REC NO:   [id number]                           ROOM:       0007  ACCOUNT NO:   [de-identified]                           ADMIT DATE: 2022  PROVIDER:     Joby Khan M.D.    Pavithra Radha OF PROCEDURE:  2022    CARDIOVERSION NOTE    CLINICAL HISTORY AND INDICATION:  This is a 78-year-old patient with  new-onset atrial fibrillation with cardiomyopathy, referred for  DORINDA-guided cardioversion. PROCEDURES:  1. DORINDA-guided cardioversion. 2.  Sedation, 3 of Versed and 75 of fentanyl between 01:00 and 01:30  p.m. in my presence under my supervision. 3.  Reversal of sedation with 0.2 mg of Romazicon and 0.4 of Narcan. PROCEDURE DETAILS:  The patient was brought to cath lab. Hands-free  pads were applied to right upper chest and left upper back. The patient  received a sedation and a DORINDA was done as above. Ruling out  intracardiac thrombi, a shock was given from a biphasic defibrillator  converting the patient back to sinus rhythm. He was awake, alert, and  oriented x3 after the procedure. CONCLUSION:  1. Successful DORINDA-guided cardioversion. 2.  No complications. RECOMMENDATIONS:  At this point, we will continue to treat the patient  with medical therapy. We will reassess shortly.         Marylu Dawson M.D.    D: 2022 14:23:48       T: 2022 14:26:03     [email or web address]  Job#: [id number]     Doc#: [id number]    CC:

## 2022-05-02 NOTE — H&P
[facility]  Sedation/Analgesia History & Physical    Pt Name: David Winters  Account number: [de-identified]  MRN: [id number]  YOB: 1945  Provider Performing Procedure: Naa Lynch MD MD [facility]  Primary Care Physician: Madelyn Chavez MD  Date: 5/2/2022    PRE-PROCEDURE    Code Status: FULL CODE  Brief History/Pre-Procedure Diagnosis:   A fib      Consent: : I have discussed with the patient risks, benefits, and alternatives (and relevant risks, benefits, and side effects related to alternatives or not receiving care), and likelihood of the success. The patient and/or representative appear to understand and agree to proceed. The discussion encompasses risks, benefits, and side effects related to the alternatives and the risks related to not receiving the proposed care, treatment, and services. 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 SURGICAL HISTORY   has a past surgical history that includes Upper gastrointestinal endoscopy (2007); back surgery (2007); Colonoscopy (2007, 2016,2019,2020); EGD (2007); skin biopsy (Left); and Arm Surgery (Left).   Additional information:       ALLERGIES   Allergies as of 05/02/2022    (No Known Allergies)     Additional information:       MEDICATIONS   Aspirin  [x] 81 mg  [] 325 mg  [] None  Antiplatelet drug therapy use last 5 days  []No []Yes  Coumadin Use Last 5 Days []No []Yes  Other anticoagulant use last 5 days  []No []Yes    Current Facility-Administered Medications:     0.9 % sodium chloride infusion, , IntraVENous, PRN, Gary Cooper PA-C    sodium chloride flush 0.9 % injection 5-40 mL, 5-40 mL, IntraVENous, 2 times per day, Stacy Mcarthur PA-C    sodium chloride flush 0.9 % injection 5-40 mL, 5-40 mL, IntraVENous, PRN, Stacy Mcarthur PA-C  Prior to Admission medications    Medication Sig Start Date End Date Taking? Authorizing Provider   potassium chloride (KLOR-CON M20) 20 MEQ extended release tablet Take 1 tablet by mouth 2 times daily 4/27/22   Sloan Apley, MD   bumetanide (BUMEX) 2 MG tablet Take 1 tablet by mouth 2 times daily 4/27/22   Sloan Apley, MD   sacubitril-valsartan (ENTRESTO) 24-26 MG per tablet Take 1 tablet by mouth 2 times daily 4/27/22   Sloan Apley, MD   carvedilol (COREG) 6.25 MG tablet Take 1 tablet by mouth 2 times daily 4/27/22   Sloan Apley, MD   apixaban (ELIQUIS) 5 MG TABS tablet Take 1 tablet by mouth 2 times daily 4/27/22   Sloan Apley, MD   aspirin 81 MG EC tablet Take 81 mg by mouth daily    Historical Provider, MD   empagliflozin (JARDIANCE) 25 MG tablet Take 25 mg by mouth daily    Historical Provider, MD   simvastatin (ZOCOR) 40 MG tablet Take 40 mg by mouth nightly     Historical Provider, MD   NONFORMULARY 2 capsules daily Indications: TriFlex    Historical Provider, MD   metFORMIN (GLUCOPHAGE-XR) 500 MG extended release tablet Take 500 mg by mouth in the morning, at noon, and at bedtime     Historical Provider, MD     Additional information:       VITAL SIGNS   There were no vitals filed for this visit.     PHYSICAL:   General: No acute distress  HEENT:  Unremarkable for age  Neck: without increased JVD, carotid pulses 2+ bilaterally without bruits  Heart: RRR, S1 & S2 WNL, S4 gallop, without murmurs or rubs    Lungs: Clear to auscultation    Abdomen: BS present, without HSM, masses, or tenderness    Extremities: without C,C,E.  Pulses 2+ bilaterally  Mental Status: Alert & Oriented        PLANNED PROCEDURE   []Cath  []PCI                []Pacemaker/AICD  [x]DORINDA             [x]Cardioversion []Peripheral angiography/PTA  []Other:      SEDATION  Planned agent: [x]Midazolam []Meperidine [x]Sublimaze []Morphine  []Diazepam  []Other:     ASA Classification:  []1 [x]2 []3 []4 []5  Class 1: A normal healthy patient  Class 2: Pt with mild to moderate systemic disease  Class 3: Severe systemic disease or disturbance  Class 4: Severe systemic disorders that are already life threatening. Class 5: Moribund pt with little chances of survival, for more than 24 hours. Mallampati I Airway Classification:   []1 [x]2 []3 []4    [x]Pre-procedure diagnostic studies complete and results available. Comment:    [x]Previous sedation/anesthesia experiences assessed. Comment:    [x]The patient is an appropriate candidate to undergo the planned procedure sedation and anesthesia. (Refer to nursing sedation/analgesia documentation record)  [x]Formulation and discussion of sedation/procedure plan, risks, and expectations with patient and/or responsible adult completed. [x]Patient examined immediately prior to the procedure.  (Refer to nursing sedation/analgesia documentation record)    Lee Ann Cobian MD MD [facility]  Electronically signed 5/2/2022 at 12:15 PM

## 2022-05-02 NOTE — FLOWSHEET NOTE
1210 Patient admitted to [address] [address] for DORINDA/Cardioversionl. Patient NPO. Patient accompanied by spouse, Cruz Flores  Vital signs obtained. Assessment and data collection intiated. Oriented to room. Policies and procedures for 2E explained. All questions answered with no further questions at this time. Fall precautions reviewed with patient. 1210 Care plan reviewed with patient and spouse. Patient and spouse verbalize understanding of the plan of care and contribute to goal setting.       1254 to cath lab per bed, stable condition. 1335 care taken over from cath lab.    1420 positive gag reflex. 1510  up in room, tolerated activity well. 1530Discharge instructions given. 1550 Patient goals/plan/treatment preferences discussed by this RN. Discharge planning provided by this RN. Patient goals/plan/treatment preferences reviewed with patient/family. Patient/family verbalize understanding of discharge plan and are in agreement with goal/plan/treatment preferences. Understanding was demonstrated using the teach back method. AVS provided by this RN at time of discharge, which includes all necessary medical information pertaining to the patients current course of illness, treatment, post-discharge goals of care, and treatment preferences.

## 2022-05-02 NOTE — PLAN OF CARE
Problem: Discharge Planning  Goal: Discharge to home or other facility with appropriate resources  Flowsheets (Taken 5/2/2022 1309)  Discharge to home or other facility with appropriate resources: Identify barriers to discharge with patient and caregiver  Note: Patient from home and plans on going home after procedure. Patient also see [address] [place] [address] clinic and wants to transfer all perscriptions to the [address] [place] [address]. [facility] .Care plan reviewed with patient and spouse. Patient and spouse verbalize understanding of the plan of care and contribute to goal setting.

## 2022-05-02 NOTE — PLAN OF CARE
Problem: Discharge Planning  Goal: Discharge to home or other facility with appropriate resources  5/2/2022 1657 by Adrian Chang RN  Outcome: Completed  5/2/2022 1307 by Adrian Chang RN  Flowsheets (Taken 5/2/2022 1307)  Discharge to home or other facility with appropriate resources: Identify barriers to discharge with patient and caregiver  Note: Patient from home and plans on going home after procedure. Patient also see [place] clinic and wants to transfer all perscriptions to the [place].

## 2022-05-10 ENCOUNTER — TELEPHONE (OUTPATIENT)
Dept: CARDIOLOGY CLINIC | Age: 77
End: 2022-05-10

## 2022-05-10 DIAGNOSIS — R06.02 SHORTNESS OF BREATH: ICD-10-CM

## 2022-05-10 DIAGNOSIS — I42.9 CARDIOMYOPATHY, UNSPECIFIED TYPE (HCC): Primary | ICD-10-CM

## 2022-05-10 DIAGNOSIS — I10 PRIMARY HYPERTENSION: ICD-10-CM

## 2022-05-10 LAB
BUN BLDV-MCNC: 27 MG/DL
CALCIUM SERPL-MCNC: 10.4 MG/DL
CHLORIDE BLD-SCNC: 95 MMOL/L
CO2: 32 MMOL/L
CREAT SERPL-MCNC: 1 MG/DL
GFR CALCULATED: 73
GLUCOSE BLD-MCNC: 186 MG/DL
POTASSIUM SERPL-SCNC: 3.6 MMOL/L
SODIUM BLD-SCNC: 142 MMOL/L

## 2022-05-10 RX ORDER — BUMETANIDE 2 MG/1
2 TABLET ORAL 2 TIMES DAILY
Qty: 180 TABLET | Refills: 3 | Status: SHIPPED | OUTPATIENT
Start: 2022-05-10 | End: 2022-05-13 | Stop reason: DRUGHIGH

## 2022-05-10 RX ORDER — CARVEDILOL 6.25 MG/1
6.25 TABLET ORAL 2 TIMES DAILY
Qty: 180 TABLET | Refills: 3 | Status: SHIPPED | OUTPATIENT
Start: 2022-05-10

## 2022-05-10 RX ORDER — POTASSIUM CHLORIDE 20 MEQ/1
20 TABLET, EXTENDED RELEASE ORAL 2 TIMES DAILY
Qty: 180 TABLET | Refills: 3 | Status: SHIPPED | OUTPATIENT
Start: 2022-05-10

## 2022-05-10 NOTE — TELEPHONE ENCOUNTER
Pt returned call and directed to [address] [address] Patient Resource number. Pt is also expressing concern about his rapid loss of weight. He states he was up around 260 lb when he saw Dr Barbara Gonzales on 4/26. He is now reporting a weight of 223 lb. He states his \"normal\" weight is around 240 lb. Pt taking Bumex 2mg BID. Please advise.

## 2022-05-10 NOTE — TELEPHONE ENCOUNTER
Pt's wife LM on VM. States pt was experiencing severe pain where the patch is under his life vest.      Attempted to call back. No answer. No VM. Suggesting pt to call [facility] for opinion.

## 2022-05-10 NOTE — TELEPHONE ENCOUNTER
Spoke with patient and his wife, understanding verbalized. Referral given to CHF clinic. Labs ordered and faxed to [facility] outpatient lab per pt request. Pt plans to have drawn later today.

## 2022-05-13 ENCOUNTER — TELEPHONE (OUTPATIENT)
Dept: CARDIOLOGY CLINIC | Age: 77
End: 2022-05-13

## 2022-05-13 RX ORDER — BUMETANIDE 2 MG/1
2 TABLET ORAL DAILY
COMMUNITY
End: 2022-08-08

## 2022-05-18 ENCOUNTER — OFFICE VISIT (OUTPATIENT)
Dept: CARDIOLOGY CLINIC | Age: 77
End: 2022-05-18
Payer: MEDICARE

## 2022-05-18 VITALS
WEIGHT: 228 LBS | SYSTOLIC BLOOD PRESSURE: 130 MMHG | OXYGEN SATURATION: 95 % | HEIGHT: 68 IN | BODY MASS INDEX: 34.56 KG/M2 | HEART RATE: 67 BPM | DIASTOLIC BLOOD PRESSURE: 80 MMHG

## 2022-05-18 DIAGNOSIS — I50.23 ACUTE ON CHRONIC SYSTOLIC CONGESTIVE HEART FAILURE, NYHA CLASS 2 (HCC): Primary | ICD-10-CM

## 2022-05-18 PROCEDURE — 1036F TOBACCO NON-USER: CPT | Performed by: NURSE PRACTITIONER

## 2022-05-18 PROCEDURE — G8417 CALC BMI ABV UP PARAM F/U: HCPCS | Performed by: NURSE PRACTITIONER

## 2022-05-18 PROCEDURE — 99214 OFFICE O/P EST MOD 30 MIN: CPT | Performed by: NURSE PRACTITIONER

## 2022-05-18 PROCEDURE — 1123F ACP DISCUSS/DSCN MKR DOCD: CPT | Performed by: NURSE PRACTITIONER

## 2022-05-18 PROCEDURE — 4040F PNEUMOC VAC/ADMIN/RCVD: CPT | Performed by: NURSE PRACTITIONER

## 2022-05-18 PROCEDURE — G8427 DOCREV CUR MEDS BY ELIG CLIN: HCPCS | Performed by: NURSE PRACTITIONER

## 2022-05-18 ASSESSMENT — ENCOUNTER SYMPTOMS
VOMITING: 0
NAUSEA: 0
SHORTNESS OF BREATH: 1
ABDOMINAL DISTENTION: 0
COUGH: 0

## 2022-05-18 NOTE — PROGRESS NOTES
Heart Failure Clinic       Visit Date: 5/18/2022  Cardiologist:  Dr. Natacha Escalera  Primary Care Physician: Dr. Edie Shah MD    Raman Cheek is a 69 y.o. male who presents today for:  Chief Complaint   Patient presents with    Congestive Heart Failure     New Patient        HPI:     TYPE HF: HFrEF 30% (2022, new)  Cause: most likely tachy induced. Device: life vest on  HX: DM HTN HLD Anemia Afib   Dry Wt:  unknown      Raman Cheek is a 69 y.o. male who presents to the office for a new patient visit in the heart failure clinic on 5/18/22. Referred by Dr. Natacha Escalera, initially seen on 4/26/22 for SOB and swelling. Concerns today: since the beginning of April he had been experiencing worsening bilateral LE swelling. Was given lasix by PCP with no relief of LE swelling, but did urinate a lot. Was referred to Dr. Natacha Escalera from his PCP      Patient follows:  Neurology       Hospitalization: > 6 months       Activity: ADLs performed.  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, left      Past Surgical History:   Procedure Laterality Date    ARM SURGERY Left     cancer removal squamous    BACK SURGERY  2007    COLONOSCOPY  2007, 2016,2019,2020    EGD  2007    SKIN BIOPSY Left     UPPER GASTROINTESTINAL ENDOSCOPY  2007     Family History   Problem Relation Age of Onset    Heart Disease Mother     Heart Disease Father     Prostate Cancer Father     Stroke Maternal Grandfather     Cancer Paternal Grandmother     Cancer Paternal Grandfather     Colon Cancer Neg Hx      Social History     Tobacco Use    Smoking status: Former Smoker     Quit date: 1970     Years since quittin.1    Smokeless tobacco: Never Used   Substance Use Topics    Alcohol use: Not Currently     Current Outpatient Medications   Medication Sig Dispense Refill    bumetanide (BUMEX) 2 MG tablet Take 2 mg by mouth daily      potassium chloride (KLOR-CON M20) 20 MEQ extended release tablet Take 1 tablet by mouth 2 times daily 180 tablet 3    sacubitril-valsartan (ENTRESTO) 24-26 MG per tablet Take 1 tablet by mouth 2 times daily 180 tablet 3    carvedilol (COREG) 6.25 MG tablet Take 1 tablet by mouth 2 times daily 180 tablet 3    apixaban (ELIQUIS) 5 MG TABS tablet Take 1 tablet by mouth 2 times daily 180 tablet 3    aspirin 81 MG EC tablet Take 81 mg by mouth daily      empagliflozin (JARDIANCE) 25 MG tablet Take 25 mg by mouth daily      simvastatin (ZOCOR) 40 MG tablet Take 40 mg by mouth nightly       NONFORMULARY 2 capsules daily Indications: TriFlex      metFORMIN (GLUCOPHAGE-XR) 500 MG extended release tablet Take 500 mg by mouth in the morning, at noon, and at bedtime        No current facility-administered medications for this visit. No Known Allergies    SUBJECTIVE:   Review of Systems   Constitutional: Negative for activity change, appetite change, diaphoresis and fatigue. Respiratory: Positive for shortness of breath. Negative for cough. Cardiovascular: Positive for leg swelling. Negative for chest pain and palpitations. Gastrointestinal: Negative for abdominal distention, nausea and vomiting. Neurological: Negative for weakness, light-headedness and headaches. Hematological: Negative for adenopathy. Psychiatric/Behavioral: Negative for sleep disturbance. OBJECTIVE:   Today's Vitals:  /80   Pulse 67   Ht 5' 8\" (1.727 m)   Wt 228 lb (103.4 kg)   SpO2 95%   BMI 34.67 kg/m²     Physical Exam  Vitals reviewed. Constitutional:       General: He is not in acute distress.      Appearance: Normal appearance. He is well-developed. He is not diaphoretic. HENT:      Head: Normocephalic and atraumatic. Eyes:      Conjunctiva/sclera: Conjunctivae normal.   Cardiovascular:      Rate and Rhythm: Normal rate and regular rhythm. Heart sounds: Normal heart sounds. No murmur heard. Pulmonary:      Effort: Pulmonary effort is normal. No respiratory distress. Breath sounds: Normal breath sounds. No wheezing or rales. Abdominal:      General: Bowel sounds are normal. There is no distension. Palpations: Abdomen is soft. Tenderness: There is no abdominal tenderness. Musculoskeletal:         General: Normal range of motion. Cervical back: Normal range of motion and neck supple. Right lower leg: No edema. Left lower leg: No edema. Skin:     General: Skin is warm and dry. Capillary Refill: Capillary refill takes less than 2 seconds. Neurological:      Mental Status: He is alert and oriented to person, place, and time. Coordination: Coordination normal.   Psychiatric:         Behavior: Behavior normal.         Wt Readings from Last 3 Encounters:   05/18/22 228 lb (103.4 kg)   05/02/22 254 lb (115.2 kg)   04/27/22 269 lb (122 kg)     BP Readings from Last 3 Encounters:   05/18/22 130/80   05/02/22 (!) 134/90   04/27/22 (!) 132/92     Pulse Readings from Last 3 Encounters:   05/18/22 67   05/02/22 73   04/27/22 90     Body mass index is 34.67 kg/m². ECHO:   Summary   Ejection fraction is visually estimated at 30%. There was moderate global hypokinesis of the left ventricle. Left Ventricular size is Mildly increased . Moderately dilated left atrium. No thrombi   Aortic valve appears tricuspid. Mild aortic regurgitation is noted. Moderate tricuspid regurgitation visualized. Moderate , layered and protruding , immobile plaque noted in the   transverse and descending aorta.       Signature K 3.6 05/10/2022    CL 95 05/10/2022    CO2 32 05/10/2022    BUN 27 05/10/2022    CREATININE 1.0 05/10/2022    LABGLOM 73 05/10/2022    LABGLOM 73 04/27/2022    GLUCOSE 186 05/10/2022    CALCIUM 10.4 05/10/2022     Hepatic Function Panel:  No results found for: ALKPHOS, ALT, AST, PROT, BILITOT, BILIDIR, IBILI, LABALBU  Magnesium:  No results found for: MG  PT/INR:    Lab Results   Component Value Date    INR 1.50 04/27/2022     Lipids:  No results found for: TRIG, HDL, LDLCALC, LDLDIRECT, LABVLDL    ASSESSMENT AND PLAN:   The patient's condition/symptoms are stable        Diagnosis Orders   1. Acute on chronic systolic congestive heart failure, NYHA class 2 (HCC)  Basic Metabolic Panel    Magnesium    Brain Natriuretic Peptide    Echo limited     Continue:  GDMT:   ACE/ARB/ARNI - Entresto 24/26    BB - Coreg 6.25 BID   Diuretic - Bumex 2mg Daily  AA - none  SGLT2 -  Jardiance  Vasodilator - none  Other - KCL 20 daily Eliquis. HFrEF 30%  Stable, appears euvolemic on exam today. Will hold off on taking Aldactone, offered today   GDMT: not on Aldactone, pt does not want at this time. Lab reviewed - K 3.6, Cr 1.0   ECHO 2022 Mild MR, mod dilation of left atrium   CATH 2022 Mild CAD, LVEDP 27    Repeat blood work in 1 month  Repeat ECHO ordered, wants moved up, will ask    No med changes today    Continue diet/fluid adherence  Continue daily wts. F/U w/ Cardiology  F/U in clinic in 3 months      Tolerating above noted HF meds, no ill side effects noted. Will continue to monitor kidney function and electrolytes. Will optimize as tolerated. Pt is compliant w/ medications. Total visit time of 45 minutes has been spent with patient on education of symptoms, management, medication, and plan of care; as well as review of chart: labs, ECHO, radiology reports, etc.   I personally spent more then 50% of the appt time face to face with the patient.   · Daily weights  · Fluid restriction of 2 Liters per day  · Limit sodium in diet to around 1182-6641 mg/day  · Monitor BP  · Activity as tolerated     Patient was instructed to call the [address] for any changes in symptoms as noted in AVS.      Return in about 3 months (around 8/18/2022). or sooner if needed     Patient given educational materials - see patient instructions. We discussed the importance of weighing oneself and recording daily. We also discussed the importance of a low sodium diet, higher sodium foods to avoid and better low sodium food options. Patient verbalizes understanding of plan of care using teach back method, and is agreeable to the treatment plan.        Electronically signed by TAI Mario CNP on 5/18/2022 at 2:10 PM

## 2022-05-18 NOTE — PATIENT INSTRUCTIONS
You may receive a survey regarding the care you received during your visit. Your input is valuable to us. We encourage you to complete and return your survey. We hope you will choose us in the future for your healthcare needs. Continue:  · Continue current medications  · Daily weights and record  · Fluid restriction of 2 Liters per day  · Limit sodium in diet to around 4667-3519 mg/day  · Monitor BP  · Activity as tolerated     Call the Heart Failure Clinic for any of the following symptoms: [phone]   Weight gain of 2-3 pounds in 1 day or 5 pounds in 1 week   Increased shortness of breath   Shortness of breath while laying down   Cough   Chest pain   Swelling in feet, ankles or legs   Tenderness or bloating in the abdomen   Fatigue    Decreased appetite or nausea    Confusion       Repeat blood work in 1 month    No med changes today    Continue diet/fluid adherence  Continue daily wts.   F/U w/ Cardiology  F/U in clinic in 3 months

## 2022-05-20 ENCOUNTER — TELEPHONE (OUTPATIENT)
Dept: CARDIOLOGY CLINIC | Age: 77
End: 2022-05-20

## 2022-05-20 NOTE — TELEPHONE ENCOUNTER
Okay. He can return it anyhow and will get the echo on time   They wont pay him for an echo before 90 days of the DORINDA time

## 2022-06-07 ENCOUNTER — TELEPHONE (OUTPATIENT)
Dept: CARDIOLOGY CLINIC | Age: 77
End: 2022-06-07

## 2022-06-07 NOTE — TELEPHONE ENCOUNTER
Pt called asking for a letter for [facility] to [employer], stating CHF and AFIB is a form of  Ischemic heart disease. Fax# [phone]. Dr. Victor Manuel Hurd ok for letter?

## 2022-06-13 ENCOUNTER — OFFICE VISIT (OUTPATIENT)
Dept: NEUROLOGY | Age: 77
End: 2022-06-13
Payer: MEDICARE

## 2022-06-13 VITALS
SYSTOLIC BLOOD PRESSURE: 110 MMHG | OXYGEN SATURATION: 97 % | HEIGHT: 68 IN | BODY MASS INDEX: 34.4 KG/M2 | DIASTOLIC BLOOD PRESSURE: 82 MMHG | WEIGHT: 227 LBS | HEART RATE: 75 BPM | RESPIRATION RATE: 16 BRPM

## 2022-06-13 DIAGNOSIS — R20.0 BILATERAL LEG NUMBNESS: Primary | ICD-10-CM

## 2022-06-13 DIAGNOSIS — M79.671 FOOT PAIN, BILATERAL: ICD-10-CM

## 2022-06-13 DIAGNOSIS — M79.672 FOOT PAIN, BILATERAL: ICD-10-CM

## 2022-06-13 DIAGNOSIS — G62.9 NEUROPATHY: ICD-10-CM

## 2022-06-13 DIAGNOSIS — M54.16 LUMBAR RADICULOPATHY: ICD-10-CM

## 2022-06-13 PROCEDURE — 1123F ACP DISCUSS/DSCN MKR DOCD: CPT | Performed by: PSYCHIATRY & NEUROLOGY

## 2022-06-13 PROCEDURE — 99213 OFFICE O/P EST LOW 20 MIN: CPT | Performed by: PSYCHIATRY & NEUROLOGY

## 2022-06-13 PROCEDURE — 1036F TOBACCO NON-USER: CPT | Performed by: PSYCHIATRY & NEUROLOGY

## 2022-06-13 PROCEDURE — G8417 CALC BMI ABV UP PARAM F/U: HCPCS | Performed by: PSYCHIATRY & NEUROLOGY

## 2022-06-13 PROCEDURE — G8427 DOCREV CUR MEDS BY ELIG CLIN: HCPCS | Performed by: PSYCHIATRY & NEUROLOGY

## 2022-06-13 NOTE — PATIENT INSTRUCTIONS
1. Continue with current medications. 2. Follow through with rheumatology evaluation re: elevated rheumatoid factor  3. Follow up as needed. 4. Call if any questions or concerns.

## 2022-06-13 NOTE — PROGRESS NOTES
NEUROLOGY OUT PATIENT FOLLOW UP NOTE:  6/13/20222:32 PM    Gavin Espitia is here for follow up for numbness and tingling in bilateral feet, bilateral foot pain. No Known Allergies    Current Outpatient Medications:     bumetanide (BUMEX) 2 MG tablet, Take 2 mg by mouth daily, Disp: , Rfl:     potassium chloride (KLOR-CON M20) 20 MEQ extended release tablet, Take 1 tablet by mouth 2 times daily, Disp: 180 tablet, Rfl: 3    sacubitril-valsartan (ENTRESTO) 24-26 MG per tablet, Take 1 tablet by mouth 2 times daily, Disp: 180 tablet, Rfl: 3    carvedilol (COREG) 6.25 MG tablet, Take 1 tablet by mouth 2 times daily, Disp: 180 tablet, Rfl: 3    apixaban (ELIQUIS) 5 MG TABS tablet, Take 1 tablet by mouth 2 times daily, Disp: 180 tablet, Rfl: 3    aspirin 81 MG EC tablet, Take 81 mg by mouth daily, Disp: , Rfl:     empagliflozin (JARDIANCE) 25 MG tablet, Take 25 mg by mouth daily, Disp: , Rfl:     simvastatin (ZOCOR) 40 MG tablet, Take 40 mg by mouth nightly , Disp: , Rfl:     NONFORMULARY, 2 capsules daily Indications: TriFlex, Disp: , Rfl:     metFORMIN (GLUCOPHAGE-XR) 500 MG extended release tablet, Take 500 mg by mouth in the morning, at noon, and at bedtime , Disp: , Rfl:     I reviewed the past medical history, allergies, medications, social history and family history. PE:   Vitals:    06/13/22 1418   BP: 110/82   Pulse: 75   Resp: 16   SpO2: 97%   Weight: 227 lb (103 kg)   Height: 5' 8\" (1.727 m)     General Appearance:  awake, alert, oriented, in no acute distress  Gen: NAD, Language is Intact. Skin: no rash, lesion, dry  to touch. warm  Head: no rash, no icterus  Neck: There is no carotid bruits. The Neck is supple. There is no neck lymphadenopathy. Neuro: CN 2-12 he is wearing hearing aids, otherwise CN are grossly intact with no focal deficits. Power 5/5 Throughout symmetric, Reflexes are  symmetric. Long tracts are reduced distally. Cerebellar exam is Intact.  Sensory exam is intact to light touch.  Gait is guarded. Musculoskeletal:  Has no hand arthritis, no limitation of ROM in any of the four extremities. Lower extremities +2  edema          DATA:             EMG done 2/24/22:      Assessment:     Diagnosis Orders   1. Bilateral leg numbness     2. Neuropathy     3. Lumbar radiculopathy     4. Foot pain, bilateral            Follow up for bilateral leg numbness, neuropathy. He has been told he has Afib new Dx, right leg hematoma, he is wearing a cardiac vest. defibrillator, has CHF. He is no on the B12. Was seen by rheumatology and told he has no clinical symptoms of RA. I reviewed the patient pertinent labs and records in the EHR and from other providers. After detailed discussion with patient and his wife we agreed on the following plan. Plan:  1. Continue with current medications. 2. Follow through with rheumatology evaluation re: elevated rheumatoid factor  3. Follow up as needed. 4. Call if any questions or concerns.     Total time 24 min    Rochelle Velasquez MD

## 2022-07-05 ENCOUNTER — TELEPHONE (OUTPATIENT)
Dept: CARDIOLOGY CLINIC | Age: 77
End: 2022-07-05

## 2022-07-05 NOTE — TELEPHONE ENCOUNTER
Per Jennie Bustos., Dr. Ariel Mccartney will be out all day on 7/25/22, patient scheduled for echo (ordered by Zoraida Soria) and same day appt with Dr. Ariel Mccratney this date. Spoke with patient and patient's wife to move echo and office visit to 7/13/22, echo arrival time of 10:45, ov at 12:30 pm. Patient verbalized understanding. Amando Poon in CHF notified of change for test tracking purposes.

## 2022-07-13 ENCOUNTER — OFFICE VISIT (OUTPATIENT)
Dept: CARDIOLOGY CLINIC | Age: 77
End: 2022-07-13
Payer: MEDICARE

## 2022-07-13 ENCOUNTER — HOSPITAL ENCOUNTER (OUTPATIENT)
Dept: NON INVASIVE DIAGNOSTICS | Age: 77
Discharge: HOME | End: 2022-07-13
Payer: MEDICARE

## 2022-07-13 ENCOUNTER — TELEPHONE (OUTPATIENT)
Dept: CARDIOLOGY CLINIC | Age: 77
End: 2022-07-13

## 2022-07-13 VITALS
HEIGHT: 68 IN | DIASTOLIC BLOOD PRESSURE: 72 MMHG | BODY MASS INDEX: 34.01 KG/M2 | HEART RATE: 77 BPM | WEIGHT: 224.4 LBS | SYSTOLIC BLOOD PRESSURE: 110 MMHG

## 2022-07-13 DIAGNOSIS — I10 PRIMARY HYPERTENSION: ICD-10-CM

## 2022-07-13 DIAGNOSIS — I50.23 ACUTE ON CHRONIC SYSTOLIC CONGESTIVE HEART FAILURE, NYHA CLASS 2 (HCC): ICD-10-CM

## 2022-07-13 DIAGNOSIS — I25.10 CORONARY ARTERY DISEASE INVOLVING NATIVE CORONARY ARTERY OF NATIVE HEART WITHOUT ANGINA PECTORIS: ICD-10-CM

## 2022-07-13 DIAGNOSIS — I42.0 DILATED CARDIOMYOPATHY (HCC): Primary | ICD-10-CM

## 2022-07-13 DIAGNOSIS — I48.0 PAROXYSMAL ATRIAL FIBRILLATION (HCC): ICD-10-CM

## 2022-07-13 PROCEDURE — G8417 CALC BMI ABV UP PARAM F/U: HCPCS | Performed by: NUCLEAR MEDICINE

## 2022-07-13 PROCEDURE — 1036F TOBACCO NON-USER: CPT | Performed by: NUCLEAR MEDICINE

## 2022-07-13 PROCEDURE — G8427 DOCREV CUR MEDS BY ELIG CLIN: HCPCS | Performed by: NUCLEAR MEDICINE

## 2022-07-13 PROCEDURE — 93307 TTE W/O DOPPLER COMPLETE: CPT

## 2022-07-13 PROCEDURE — 99213 OFFICE O/P EST LOW 20 MIN: CPT | Performed by: NUCLEAR MEDICINE

## 2022-07-13 PROCEDURE — 1123F ACP DISCUSS/DSCN MKR DOCD: CPT | Performed by: NUCLEAR MEDICINE

## 2022-07-13 NOTE — TELEPHONE ENCOUNTER
Pt had echo today.   Please send results to Dr. Varner when we get them to see if pt can remove LifeVest

## 2022-07-13 NOTE — PROGRESS NOTES
[id number]  [address]  [place] [address]  Dept: [phone]  Dept Fax: [phone]  Loc: [phone]    Visit Date: 2022    Luisa Schwartz is a 69 y.o. 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, left       Past Surgical History:   Procedure Laterality Date    ARM SURGERY Left     cancer removal squamous    BACK SURGERY      COLONOSCOPY  , ,,    EGD      SKIN BIOPSY Left     UPPER GASTROINTESTINAL ENDOSCOPY       Family History   Problem Relation Age of Onset    Heart Disease Mother     Heart Disease Father     Prostate Cancer Father     Stroke Maternal Grandfather     Cancer Paternal Grandmother     Cancer Paternal Grandfather     Colon Cancer Neg Hx      Social History     Tobacco Use    Smoking status: Former Smoker     Quit date: 1970     Years since quittin.5    Smokeless tobacco: Never Used   Substance Use Topics    Alcohol use: Not Currently      Current Outpatient Medications   Medication Sig Dispense Refill    bumetanide (BUMEX) 2 MG tablet Take 2 mg by mouth daily      potassium chloride (KLOR-CON M20) 20 MEQ extended release tablet Take 1 tablet by mouth 2 times daily 180 tablet 3    sacubitril-valsartan (ENTRESTO) 24-26 MG per tablet Take 1 tablet by mouth 2 times daily 180 tablet 3    carvedilol (COREG) 6.25 MG tablet Take 1 tablet by mouth 2 times daily 180 tablet 3    apixaban (ELIQUIS) 5 MG TABS tablet Take 1 tablet by mouth 2 times daily 180 tablet 3    aspirin 81 MG EC tablet Take 81 mg by mouth daily      empagliflozin (JARDIANCE) 25 MG tablet Take 25 mg by mouth daily      simvastatin (ZOCOR) 40 MG tablet Take 40 mg by mouth nightly       NONFORMULARY 2 capsules daily Indications: TriFlex      metFORMIN (GLUCOPHAGE-XR) 500 MG extended release tablet Take 500 mg by mouth in the morning, at noon, and at bedtime        No current facility-administered medications for this visit. No Known Allergies  Health Maintenance   Topic Date Due    Annual Wellness Visit (AWV)  Never done    Lipids  Never done    Depression Screen  Never done    Hepatitis C screen  Never done    Shingles vaccine (2 of 3) 02/21/2013    Pneumococcal 65+ years Vaccine (2 - PPSV23 or PCV20) 11/23/2016    Flu vaccine (1) 09/01/2022    DTaP/Tdap/Td vaccine (2 - Td or Tdap) 08/04/2024    COVID-19 Vaccine  Completed    Hepatitis A vaccine  Aged Out    Hib vaccine  Aged Out    Meningococcal (ACWY) vaccine  Aged Out       Subjective:  Review of Systems  General:   No fever, no chills, some fatigue or weight loss  Pulmonary:    some dyspnea, no wheezing  Cardiac:    Denies recent chest pain,   GI:     No nausea or vomiting, no abdominal pain  Neuro:     No dizziness or light headedness,   Musculoskeletal:  No recent active issues  Extremities:   No edema, no obvious claudication       Objective:  Physical Exam  /72   Pulse 77   Ht 5' 8\" (1.727 m)   Wt 224 lb 6.4 oz (101.8 kg)   BMI 34.12 kg/m²   General:   Well developed, well nourished  Lungs:    Clear to auscultation  Heart:    Normal S1 S2, Slight murmur. no rubs, no gallops  Abdomen:   Soft, non tender, no organomegalies, positive bowel sounds  Extremities:   No edema, no cyanosis, good peripheral pulses  Neurological:   Awake, alert, oriented.  No obvious focal deficits  Musculoskelatal:  No obvious deformities    Assessment:      Diagnosis Orders   1. Dilated cardiomyopathy ([address]     2. Primary hypertension     3. Coronary artery disease involving native coronary artery of native heart without angina pectoris     as above  Pending the echo   Pending the EF and ICD decision       Plan:  No follow-ups on file. Okay for holding eliquis PRN for the bleeding   Decide on the ICD after the echo is done  Continue risk factor modification and medical management  Thank you for allowing me to participate in the care of your patient. Please don't hesitate to contact me regarding any further issues related to the patient care    Orders Placed:  No orders of the defined types were placed in this encounter. Medications Prescribed:  No orders of the defined types were placed in this encounter. Discussed use, benefit, and side effects of prescribed medications. All patient questions answered. Pt voicedunderstanding. Instructed to continue current medications, diet and exercise. Continue risk factor modification and medical management. Patient agreed with treatment plan. Follow up as directed.     Electronically signedby Kenisha Wilson MD on 7/13/2022 at 12:24 PM

## 2022-08-08 ENCOUNTER — OFFICE VISIT (OUTPATIENT)
Dept: CARDIOLOGY CLINIC | Age: 77
End: 2022-08-08
Payer: MEDICARE

## 2022-08-08 VITALS
HEIGHT: 68 IN | OXYGEN SATURATION: 98 % | DIASTOLIC BLOOD PRESSURE: 73 MMHG | WEIGHT: 226.2 LBS | SYSTOLIC BLOOD PRESSURE: 129 MMHG | BODY MASS INDEX: 34.28 KG/M2 | HEART RATE: 69 BPM

## 2022-08-08 DIAGNOSIS — I50.32 CHRONIC DIASTOLIC CONGESTIVE HEART FAILURE, NYHA CLASS 2 (HCC): Primary | ICD-10-CM

## 2022-08-08 LAB
B-TYPE NATRIURETIC PEPTIDE: 316 PG/ML
BUN BLDV-MCNC: 32 MG/DL
CALCIUM SERPL-MCNC: 9.4 MG/DL
CHLORIDE BLD-SCNC: 105 MMOL/L
CO2: 30 MMOL/L
CREAT SERPL-MCNC: 0.9 MG/DL
GFR CALCULATED: 82
GLUCOSE BLD-MCNC: 142 MG/DL
MAGNESIUM: 1.9 MG/DL
POTASSIUM SERPL-SCNC: 3.9 MMOL/L
SODIUM BLD-SCNC: 144 MMOL/L

## 2022-08-08 PROCEDURE — G8427 DOCREV CUR MEDS BY ELIG CLIN: HCPCS | Performed by: NURSE PRACTITIONER

## 2022-08-08 PROCEDURE — 1123F ACP DISCUSS/DSCN MKR DOCD: CPT | Performed by: NURSE PRACTITIONER

## 2022-08-08 PROCEDURE — 1036F TOBACCO NON-USER: CPT | Performed by: NURSE PRACTITIONER

## 2022-08-08 PROCEDURE — G8417 CALC BMI ABV UP PARAM F/U: HCPCS | Performed by: NURSE PRACTITIONER

## 2022-08-08 PROCEDURE — 99213 OFFICE O/P EST LOW 20 MIN: CPT | Performed by: NURSE PRACTITIONER

## 2022-08-08 RX ORDER — BUMETANIDE 2 MG/1
2 TABLET ORAL DAILY
Qty: 45 TABLET | Refills: 3
Start: 2022-08-08

## 2022-08-08 RX ORDER — BUMETANIDE 2 MG/1
1 TABLET ORAL DAILY
Qty: 45 TABLET | Refills: 3
Start: 2022-08-08 | End: 2022-08-08

## 2022-08-08 ASSESSMENT — ENCOUNTER SYMPTOMS
VOMITING: 0
ABDOMINAL DISTENTION: 0
SHORTNESS OF BREATH: 1
COUGH: 0
NAUSEA: 0

## 2022-08-08 NOTE — PROGRESS NOTES
Heart Failure Clinic       Visit Date: 8/8/2022  Cardiologist:  Dr. Andrea Wilder  Primary Care Physician: Dr. Getachew Potter MD    Toni Mckeon is a 69 y.o. male who presents today for:  Chief Complaint   Patient presents with    Congestive Heart Failure       HPI:     TYPE HF: HFpEF 55%; (HFrEF 30% 2022, new)  Cause: most likely tachy induced. Device: none  HX: DM HTN HLD Anemia Afib (Eliquis)  Dry Wt:  unknown (228 on 5/18/22) (224 on 8/8/22)      Toni Mckeon is a 69 y.o. male who presents to the office for a f/u patient visit in the heart failure clinic on 5/18/22. Referred by Dr. Andrea Wilder, initially seen on 4/26/22 for SOB and swelling. Concerns today: Pt doing well, still urinating really well. Still eating out a lot. No weight gain or hospitalizations. Did not get labs done from last visit    Visit on 5/18/22: since the beginning of April he had been experiencing worsening bilateral LE swelling. Was given lasix by PCP with no relief of LE swelling, but did urinate a lot. Was referred to Dr. Andrea Wilder from his PCP      Patient follows:  Neurology       Hospitalization: > 6 months       Activity: ADLs performed. Improved WRIGHT w/ cardioversion   Diet: eats out 1-2x a week. Does not add salt to food.  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, left      Past Surgical History:   Procedure Laterality Date    ARM SURGERY Left     cancer removal squamous    BACK SURGERY  2007    COLONOSCOPY  2007, 2016,2019,2020    EGD  2007    SKIN BIOPSY Left     UPPER GASTROINTESTINAL ENDOSCOPY  2007     Family History   Problem Relation Age of Onset    Heart Disease Mother     Heart Disease Father     Prostate Cancer Father     Stroke Maternal Grandfather     Cancer Paternal Grandmother     Cancer Paternal Grandfather     Colon Cancer Neg Hx      Social History     Tobacco Use    Smoking status: Former     Types: Cigarettes     Quit date: 1970     Years since quittin.6    Smokeless tobacco: Never   Substance Use Topics    Alcohol use: Not Currently     Current Outpatient Medications   Medication Sig Dispense Refill    bumetanide (BUMEX) 2 MG tablet Take 1 tablet by mouth in the morning. 45 tablet 3    potassium chloride (KLOR-CON M20) 20 MEQ extended release tablet Take 1 tablet by mouth 2 times daily 180 tablet 3    sacubitril-valsartan (ENTRESTO) 24-26 MG per tablet Take 1 tablet by mouth 2 times daily 180 tablet 3    carvedilol (COREG) 6.25 MG tablet Take 1 tablet by mouth 2 times daily 180 tablet 3    apixaban (ELIQUIS) 5 MG TABS tablet Take 1 tablet by mouth 2 times daily 180 tablet 3    aspirin 81 MG EC tablet Take 81 mg by mouth daily      empagliflozin (JARDIANCE) 25 MG tablet Take 25 mg by mouth daily      simvastatin (ZOCOR) 40 MG tablet Take 40 mg by mouth nightly       NONFORMULARY 2 capsules daily Indications: TriFlex      metFORMIN (GLUCOPHAGE-XR) 500 MG extended release tablet Take 500 mg by mouth in the morning, at noon, and at bedtime        No current facility-administered medications for this visit. No Known Allergies    SUBJECTIVE:   Review of Systems   Constitutional:  Negative for activity change, appetite change, diaphoresis and fatigue. Respiratory:  Positive for shortness of breath. Negative for cough. Cardiovascular:  Negative for chest pain, palpitations and leg swelling. Gastrointestinal:  Negative for abdominal distention, nausea and vomiting. Neurological:  Negative for weakness, light-headedness and headaches. Hematological:  Negative for adenopathy. Psychiatric/Behavioral:  Negative for sleep disturbance.       OBJECTIVE: Today's Vitals:  /73   Pulse 69   Ht 5' 8\" (1.727 m)   Wt 226 lb 3.2 oz (102.6 kg)   SpO2 98%   BMI 34.39 kg/m²     Physical Exam  Vitals reviewed. Constitutional:       General: He is not in acute distress. Appearance: Normal appearance. He is well-developed. He is not diaphoretic. HENT:      Head: Normocephalic and atraumatic. Eyes:      Conjunctiva/sclera: Conjunctivae normal.   Cardiovascular:      Rate and Rhythm: Normal rate and regular rhythm. Heart sounds: Normal heart sounds. No murmur heard. Pulmonary:      Effort: Pulmonary effort is normal. No respiratory distress. Breath sounds: Normal breath sounds. No wheezing or rales. Abdominal:      General: Bowel sounds are normal. There is no distension. Palpations: Abdomen is soft. Tenderness: There is no abdominal tenderness. Musculoskeletal:         General: Normal range of motion. Cervical back: Normal range of motion and neck supple. Right lower leg: No edema. Left lower leg: No edema. Skin:     General: Skin is warm and dry. Capillary Refill: Capillary refill takes less than 2 seconds. Neurological:      Mental Status: He is alert and oriented to person, place, and time. Coordination: Coordination normal.   Psychiatric:         Behavior: Behavior normal.       Wt Readings from Last 3 Encounters:   08/08/22 226 lb 3.2 oz (102.6 kg)   07/13/22 224 lb 6.4 oz (101.8 kg)   06/13/22 227 lb (103 kg)     BP Readings from Last 3 Encounters:   08/08/22 129/73   07/13/22 110/72   06/13/22 110/82     Pulse Readings from Last 3 Encounters:   08/08/22 69   07/13/22 77   06/13/22 75     Body mass index is 34.39 kg/m². ECHO:   8/4/22      Summary   Ejection fraction is visually estimated at 30%. There was moderate global hypokinesis of the left ventricle. Left Ventricular size is Mildly increased . Moderately dilated left atrium. No thrombi   Aortic valve appears tricuspid.    Mild aortic regurgitation is noted. Moderate tricuspid regurgitation visualized. Moderate , layered and protruding , immobile plaque noted in the   transverse and descending aorta. Signature      ----------------------------------------------------------------   Electronically signed by Renzo Weldon MD (Interpreting   physician) on 05/02/2022 at 06:19 PM   ----------------------------------------------------------------      CATH/STRESS:   HEMODYNAMIC RESULTS:  LEFT VENTRICULOGRAM:  Left ventricular end-diastolic pressure was 27  mmHg with no significant change before and after contrast injection. No  significant gradient across the aortic valve to signify aortic stenosis. Left ventricular function was abnormal with dilated left ventricle, EF  of 25%. RIGHT HEART CATHETERIZATION:  Right heart catheterization revealed  elevated right ventricular pressure of 55 mmHg. Pulmonary arterial  pressure was difficult to obtain due to enlargement of the right  ventricle and difficulty getting the [place] to advance to the  pulmonary artery. CORONARY ARTERIOGRAM RESULTS:  1. Left main is patent, gives rise to left anterior descending and left  circumflex artery. 2.  Left anterior descending artery has minimal luminal irregularity. 3.  Left circumflex artery is codominant and patent. 4.  Right coronary artery is codominant, has nonobstructive 30 to 40%  proximal stenosis. CONCLUSION:  1. Mild nonobstructive coronary disease. 2.  Cardiomyopathy, likely explaining the patient's symptoms. 3.  Elevated filling pressures. 4.  No complications. At this point, we will continue with diuresis. We will add beta blocker  and ACE inhibitors. We will consider Entresto and will plan on a  LifeVest and go from there. The patient will need DORINDA cardioversion  next week. We will discuss treatment plan with the family.             Results reviewed:  BNP: No results found for: BNP  CBC:   Lab Results Component Value Date/Time    WBC 5.7 04/27/2022 05:32 AM    RBC 5.58 04/27/2022 05:32 AM    HGB 16.5 04/27/2022 05:32 AM    HCT 52.3 04/27/2022 05:32 AM     04/27/2022 05:32 AM     CMP:    Lab Results   Component Value Date/Time     05/10/2022 12:00 AM    K 3.6 05/10/2022 12:00 AM    CL 95 05/10/2022 12:00 AM    CO2 32 05/10/2022 12:00 AM    BUN 27 05/10/2022 12:00 AM    CREATININE 1.0 05/10/2022 12:00 AM    LABGLOM 73 05/10/2022 12:00 AM    LABGLOM 73 04/27/2022 05:32 AM    GLUCOSE 186 05/10/2022 12:00 AM    CALCIUM 10.4 05/10/2022 12:00 AM     Hepatic Function Panel:  No results found for: ALKPHOS, ALT, AST, PROT, BILITOT, BILIDIR, IBILI, LABALBU  Magnesium:  No results found for: MG  PT/INR:    Lab Results   Component Value Date/Time    INR 1.50 04/27/2022 05:32 AM     Lipids:  No results found for: TRIG, HDL, LDLCALC, LDLDIRECT, LABVLDL    ASSESSMENT AND PLAN:   The patient's condition/symptoms are stable        Diagnosis Orders   1. Chronic diastolic congestive heart failure, NYHA class 2 (HCC)  Basic Metabolic Panel    Magnesium    Brain Natriuretic Peptide        Continue:  GDMT:   ACE/ARB/ARNI - Entresto 24/26 BID   BB - Coreg 6.25 BID   Diuretic - Bumex 2mg Daily  AA - none  SGLT2 -  Jardiance  Vasodilator - none  Other - KCL 20 BID Eliquis. HFrEF 50% 8/2022 (30% 5/2022)  Stable, appears euvolemic on exam today. Almost complete resolution of SOB. No leg swelling, responding well to Bumex  GDMT: not on Aldactone, pt does not want at this time. Lab reviewed - K 3.6, Cr 1.0   ECHO 2022 Mild MR, mod dilation of left atrium   CATH 2022 Mild CAD, LVEDP 27    Repeat blood work today    No med changes today    Continue diet/fluid adherence  Continue daily wts. F/U w/ Cardiology  F/U in clinic in 6 months      Tolerating above noted HF meds, no ill side effects noted. Will continue to monitor kidney function and electrolytes. Will optimize as tolerated.    Pt is compliant w/ medications. Total visit time of 25 minutes has been spent with patient on education of symptoms, management, medication, and plan of care; as well as review of chart: labs, ECHO, radiology reports, etc.   I personally spent more then 50% of the appt time face to face with the patient. Daily weights  Fluid restriction of 2 Liters per day  Limit sodium in diet to around 4127-7928 mg/day  Monitor BP  Activity as tolerated     Patient was instructed to call the [facility] for any changes in symptoms as noted in AVS.      Return in about 6 months (around 2/8/2023). or sooner if needed     Patient given educational materials - see patient instructions. We discussed the importance of weighing oneself and recording daily. We also discussed the importance of a low sodium diet, higher sodium foods to avoid and better low sodium food options. Patient verbalizes understanding of plan of care using teach back method, and is agreeable to the treatment plan.        Electronically signed by TAI Moody CNP on 8/8/2022 at 9:43 AM

## 2022-08-08 NOTE — PATIENT INSTRUCTIONS
You may receive a survey regarding the care you received during your visit. Your input is valuable to us. We encourage you to complete and return your survey. We hope you will choose us in the future for your healthcare needs. Continue:  Continue current medications  Daily weights and record  Fluid restriction of 2 Liters per day  Limit sodium in diet to around 9763-6967 mg/day  Monitor BP  Activity as tolerated     Call the Heart Failure Clinic for any of the following symptoms: [phone]  Weight gain of 2-3 pounds in 1 day or 5 pounds in 1 week  Increased shortness of breath  Shortness of breath while laying down  Cough  Chest pain  Swelling in feet, ankles or legs  Tenderness or bloating in the abdomen  Fatigue   Decreased appetite or nausea   Confusion      Repeat blood work today    No med changes today    Continue diet/fluid adherence  Continue daily wts.   F/U w/ Cardiology  F/U in clinic in 6 months

## 2023-01-12 ENCOUNTER — OFFICE VISIT (OUTPATIENT)
Dept: CARDIOLOGY CLINIC | Age: 78
End: 2023-01-12
Payer: MEDICARE

## 2023-01-12 VITALS
HEART RATE: 71 BPM | HEIGHT: 68 IN | WEIGHT: 225.6 LBS | SYSTOLIC BLOOD PRESSURE: 115 MMHG | DIASTOLIC BLOOD PRESSURE: 66 MMHG | BODY MASS INDEX: 34.19 KG/M2

## 2023-01-12 DIAGNOSIS — I10 PRIMARY HYPERTENSION: ICD-10-CM

## 2023-01-12 DIAGNOSIS — I48.0 PAROXYSMAL ATRIAL FIBRILLATION (HCC): ICD-10-CM

## 2023-01-12 DIAGNOSIS — I42.0 DILATED CARDIOMYOPATHY (HCC): Primary | ICD-10-CM

## 2023-01-12 PROCEDURE — G8484 FLU IMMUNIZE NO ADMIN: HCPCS | Performed by: NUCLEAR MEDICINE

## 2023-01-12 PROCEDURE — G8417 CALC BMI ABV UP PARAM F/U: HCPCS | Performed by: NUCLEAR MEDICINE

## 2023-01-12 PROCEDURE — 1036F TOBACCO NON-USER: CPT | Performed by: NUCLEAR MEDICINE

## 2023-01-12 PROCEDURE — 99213 OFFICE O/P EST LOW 20 MIN: CPT | Performed by: NUCLEAR MEDICINE

## 2023-01-12 PROCEDURE — 3078F DIAST BP <80 MM HG: CPT | Performed by: NUCLEAR MEDICINE

## 2023-01-12 PROCEDURE — G8427 DOCREV CUR MEDS BY ELIG CLIN: HCPCS | Performed by: NUCLEAR MEDICINE

## 2023-01-12 PROCEDURE — 1123F ACP DISCUSS/DSCN MKR DOCD: CPT | Performed by: NUCLEAR MEDICINE

## 2023-01-12 PROCEDURE — 3074F SYST BP LT 130 MM HG: CPT | Performed by: NUCLEAR MEDICINE

## 2023-01-12 RX ORDER — MECLIZINE HYDROCHLORIDE 25 MG/1
TABLET ORAL
COMMUNITY
Start: 2023-01-09

## 2023-01-12 NOTE — PROGRESS NOTES
[id number] [facility] [address]  [address] [place] [id number]  Dept: [phone]  Dept Fax: [phone]  Loc: [phone]    Visit Date: 2023    Apurva Srivastava is a 69 y.o. 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, left       Past Surgical History:   Procedure Laterality Date    ARM SURGERY Left     cancer removal squamous    BACK SURGERY      COLONOSCOPY  , ,,    EGD  2007    SKIN BIOPSY Left     UPPER GASTROINTESTINAL ENDOSCOPY       Family History   Problem Relation Age of Onset    Heart Disease Mother     Heart Disease Father     Prostate Cancer Father     Stroke Maternal Grandfather     Cancer Paternal Grandmother     Cancer Paternal Grandfather     Colon Cancer Neg Hx      Social History     Tobacco Use    Smoking status: Former     Types: Cigarettes     Quit date: 1970     Years since quittin.0    Smokeless tobacco: Never   Substance Use Topics    Alcohol use: Not Currently      Current Outpatient Medications   Medication Sig Dispense Refill    meclizine (ANTIVERT) 25 MG tablet       bumetanide (BUMEX) 2 MG tablet Take 1 tablet by mouth in the morning.  45 tablet 3    potassium chloride (KLOR-CON M20) 20 MEQ extended release tablet Take 1 tablet by mouth 2 times daily 180 tablet 3    sacubitril-valsartan (ENTRESTO) 24-26 MG per tablet Take 1 tablet by mouth 2 times daily 180 tablet 3    carvedilol (COREG) 6.25 MG tablet Take 1 tablet by mouth 2 times daily 180 tablet 3    apixaban (ELIQUIS) 5 MG TABS tablet Take 1 tablet by mouth 2 times daily 180 tablet 3    aspirin 81 MG EC tablet Take 81 mg by mouth daily      empagliflozin (JARDIANCE) 25 MG tablet Take 25 mg by mouth daily      simvastatin (ZOCOR) 40 MG tablet Take 40 mg by mouth nightly       NONFORMULARY 2 capsules daily Indications: TriFlex      metFORMIN (GLUCOPHAGE-XR) 500 MG extended release tablet Take 500 mg by mouth in the morning, at noon, and at bedtime        No current facility-administered medications for this visit. No Known Allergies  Health Maintenance   Topic Date Due    Lipids  Never done    Depression Screen  Never done    Hepatitis C screen  Never done    Shingles vaccine (2 of 3) 02/21/2013    Pneumococcal 65+ years Vaccine (2 - PPSV23 if available, else PCV20) 11/23/2016    Annual Wellness Visit (AWV)  Never done    Flu vaccine (1) 08/01/2022    DTaP/Tdap/Td vaccine (2 - Td or Tdap) 08/04/2024    COVID-19 Vaccine  Completed    Hepatitis A vaccine  Aged Out    Hib vaccine  Aged Out    Meningococcal (ACWY) vaccine  Aged Out       Subjective:  Review of Systems  General:   No fever, no chills, No fatigue or weight loss  Pulmonary:    No dyspnea, no wheezing  Cardiac:    Denies recent chest pain,   GI:     No nausea or vomiting, no abdominal pain  Neuro:    some dizziness or light headedness,   Musculoskeletal:  No recent active issues  Extremities:   No edema, no obvious claudication     Objective:  Physical Exam  /66   Pulse 71   Ht 5' 8\" (1.727 m)   Wt 225 lb 9.6 oz (102.3 kg)   BMI 34.30 kg/m²   General:   Well developed, well nourished  Lungs:   Clear to auscultation  Heart:    Normal S1 S2, Slight murmur. no rubs, no gallops  Abdomen:   Soft, non tender, no organomegalies, positive bowel sounds  Extremities:   No edema, no cyanosis, good peripheral pulses  Neurological:   Awake, alert, oriented.  No obvious focal deficits  Musculoskelatal:  No obvious deformities    Assessment:      Diagnosis Orders   1. Dilated cardiomyopathy (HCC)        2. Paroxysmal atrial fibrillation ([address]        3. Primary hypertension        As above  Cardiac seems stable       Plan:  No follow-ups on file. As above  Continue risk factor modification and medical management'Thank you for allowing me to participate in the care of your patient. Please don't hesitate to contact me regarding any further issues related to the patient care    Orders Placed:  No orders of the defined types were placed in this encounter. Medications Prescribed:  No orders of the defined types were placed in this encounter. Discussed use, benefit, and side effects of prescribed medications. All patient questions answered. Pt voicedunderstanding. Instructed to continue current medications, diet and exercise. Continue risk factor modification and medical management. Patient agreed with treatment plan. Follow up as directed.     Electronically signedby Raymond Lizarraga MD on 1/12/2023 at 12:40 PM

## 2023-03-06 ENCOUNTER — OFFICE VISIT (OUTPATIENT)
Dept: CARDIOLOGY CLINIC | Age: 78
End: 2023-03-06
Payer: MEDICARE

## 2023-03-06 VITALS
WEIGHT: 225 LBS | SYSTOLIC BLOOD PRESSURE: 120 MMHG | OXYGEN SATURATION: 93 % | BODY MASS INDEX: 34.1 KG/M2 | DIASTOLIC BLOOD PRESSURE: 60 MMHG | HEART RATE: 66 BPM | HEIGHT: 68 IN

## 2023-03-06 DIAGNOSIS — I42.8 NONISCHEMIC CARDIOMYOPATHY (HCC): Primary | ICD-10-CM

## 2023-03-06 DIAGNOSIS — I50.32 CHRONIC DIASTOLIC CONGESTIVE HEART FAILURE, NYHA CLASS 2 (HCC): ICD-10-CM

## 2023-03-06 DIAGNOSIS — Z91.89 AT RISK FOR FLUID VOLUME OVERLOAD: ICD-10-CM

## 2023-03-06 DIAGNOSIS — I48.0 PAROXYSMAL ATRIAL FIBRILLATION (HCC): ICD-10-CM

## 2023-03-06 LAB
B-TYPE NATRIURETIC PEPTIDE: 162 PG/ML
BUN BLDV-MCNC: 24 MG/DL
CALCIUM SERPL-MCNC: 9.4 MG/DL
CHLORIDE BLD-SCNC: 102 MMOL/L
CO2: 29 MMOL/L
CREAT SERPL-MCNC: 0.8 MG/DL
EGFR: 86
GLUCOSE BLD-MCNC: 138 MG/DL
MAGNESIUM: 2 MG/DL
POTASSIUM SERPL-SCNC: 3.7 MMOL/L
SODIUM BLD-SCNC: 143 MMOL/L

## 2023-03-06 PROCEDURE — G8484 FLU IMMUNIZE NO ADMIN: HCPCS | Performed by: NURSE PRACTITIONER

## 2023-03-06 PROCEDURE — 3074F SYST BP LT 130 MM HG: CPT | Performed by: NURSE PRACTITIONER

## 2023-03-06 PROCEDURE — 1036F TOBACCO NON-USER: CPT | Performed by: NURSE PRACTITIONER

## 2023-03-06 PROCEDURE — G8417 CALC BMI ABV UP PARAM F/U: HCPCS | Performed by: NURSE PRACTITIONER

## 2023-03-06 PROCEDURE — 1123F ACP DISCUSS/DSCN MKR DOCD: CPT | Performed by: NURSE PRACTITIONER

## 2023-03-06 PROCEDURE — 99214 OFFICE O/P EST MOD 30 MIN: CPT | Performed by: NURSE PRACTITIONER

## 2023-03-06 PROCEDURE — G8427 DOCREV CUR MEDS BY ELIG CLIN: HCPCS | Performed by: NURSE PRACTITIONER

## 2023-03-06 PROCEDURE — 3078F DIAST BP <80 MM HG: CPT | Performed by: NURSE PRACTITIONER

## 2023-03-06 RX ORDER — CARVEDILOL 6.25 MG/1
6.25 TABLET ORAL 2 TIMES DAILY
Qty: 180 TABLET | Refills: 3 | Status: SHIPPED | OUTPATIENT
Start: 2023-03-06 | End: 2023-03-07 | Stop reason: SDUPTHER

## 2023-03-06 RX ORDER — BUMETANIDE 2 MG/1
2 TABLET ORAL DAILY
Qty: 90 TABLET | Refills: 3 | Status: SHIPPED | OUTPATIENT
Start: 2023-03-06 | End: 2023-03-07 | Stop reason: SDUPTHER

## 2023-03-06 RX ORDER — POTASSIUM CHLORIDE 20 MEQ/1
20 TABLET, EXTENDED RELEASE ORAL 2 TIMES DAILY
Qty: 180 TABLET | Refills: 3 | Status: SHIPPED | OUTPATIENT
Start: 2023-03-06 | End: 2023-03-07 | Stop reason: SDUPTHER

## 2023-03-06 ASSESSMENT — ENCOUNTER SYMPTOMS
VOMITING: 0
NAUSEA: 0
ABDOMINAL DISTENTION: 0
SHORTNESS OF BREATH: 1
COUGH: 0

## 2023-03-06 NOTE — PROGRESS NOTES
Heart Failure Clinic       Visit Date: 3/6/2023  Cardiologist:  Dr. Aida Grewal  Primary Care Physician: Dr. Landy Ireland MD    Polo Major is a 69 y.o. male who presents today for:  Chief Complaint   Patient presents with    Congestive Heart Failure       HPI:     TYPE HF: HFimpEF 55%; (HFrEF 30% 2022, new)  Cause: most likely tachy induced. Device: none  HX: DM HTN HLD Anemia Afib (Eliquis)  Dry Wt:  unknown (228 on 5/18/22) (224 on 8/8/22) (225 on 3/6/23)      Polo Major is a 69 y.o. male who presents to the office for a f/u patient visit in the heart failure clinic on 5/18/22. Referred by Dr. Aida Grewal, initially seen on 4/26/22 for SOB and swelling. Concerns today: here today for his 6 month f/u. Doing well. Stable weights and no CHF hospitalizations. Urinating well on his Bumex. Wearing compression socks. ADLs performed w/o limitation. Still golfing, can do yard work w/ no limitations. Visit on 8/8/23: Pt doing well, still urinating really well. Still eating out a lot. No weight gain or hospitalizations. Did not get labs done from last visit    Visit on 5/18/22: since the beginning of April he had been experiencing worsening bilateral LE swelling. Was given lasix by PCP with no relief of LE swelling, but did urinate a lot. Was referred to Dr. Aida Grewal from his PCP      Patient follows:  Neurology       Hospitalization: > 6 months       Activity: ADLs performed. Improved WRIGHT w/ cardioversion   Diet: eats out 1-2x a week. Does not add salt to food.  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, left      Past Surgical History:   Procedure Laterality Date    ARM SURGERY Left     cancer removal squamous    BACK SURGERY  2007    COLONOSCOPY  , 2016,2019,2020    EGD  2007    SKIN BIOPSY Left     UPPER GASTROINTESTINAL ENDOSCOPY  2007     Family History   Problem Relation Age of Onset    Heart Disease Mother     Heart Disease Father     Prostate Cancer Father     Stroke Maternal Grandfather     Cancer Paternal Grandmother     Cancer Paternal Grandfather     Colon Cancer Neg Hx      Social History     Tobacco Use    Smoking status: Former     Types: Cigarettes     Quit date: 1970     Years since quittin.2    Smokeless tobacco: Never   Substance Use Topics    Alcohol use: Not Currently     Current Outpatient Medications   Medication Sig Dispense Refill    meclizine (ANTIVERT) 25 MG tablet       bumetanide (BUMEX) 2 MG tablet Take 1 tablet by mouth in the morning. 45 tablet 3    potassium chloride (KLOR-CON M20) 20 MEQ extended release tablet Take 1 tablet by mouth 2 times daily 180 tablet 3    sacubitril-valsartan (ENTRESTO) 24-26 MG per tablet Take 1 tablet by mouth 2 times daily 180 tablet 3    carvedilol (COREG) 6.25 MG tablet Take 1 tablet by mouth 2 times daily 180 tablet 3    apixaban (ELIQUIS) 5 MG TABS tablet Take 1 tablet by mouth 2 times daily 180 tablet 3    aspirin 81 MG EC tablet Take 81 mg by mouth daily      empagliflozin (JARDIANCE) 25 MG tablet Take 25 mg by mouth daily      simvastatin (ZOCOR) 40 MG tablet Take 40 mg by mouth nightly       NONFORMULARY 2 capsules daily Indications: TriFlex      metFORMIN (GLUCOPHAGE-XR) 500 MG extended release tablet Take 500 mg by mouth in the morning, at noon, and at bedtime        No current facility-administered medications for this visit. No Known Allergies    SUBJECTIVE:   Review of Systems   Constitutional:  Negative for activity change, appetite change, diaphoresis and fatigue. Respiratory:  Positive for shortness of breath. Negative for cough.     Cardiovascular:  Negative for chest pain, palpitations and leg swelling. Gastrointestinal:  Negative for abdominal distention, nausea and vomiting. Neurological:  Negative for weakness, light-headedness and headaches. Hematological:  Negative for adenopathy. Psychiatric/Behavioral:  Negative for sleep disturbance. OBJECTIVE:   Today's Vitals:  /60   Pulse 66   Ht 5' 8\" (1.727 m)   Wt 225 lb (102.1 kg)   SpO2 93%   BMI 34.21 kg/m²     Physical Exam  Vitals reviewed. Constitutional:       General: He is not in acute distress. Appearance: Normal appearance. He is well-developed. He is not diaphoretic. HENT:      Head: Normocephalic and atraumatic. Eyes:      Conjunctiva/sclera: Conjunctivae normal.   Cardiovascular:      Rate and Rhythm: Normal rate and regular rhythm. Heart sounds: Normal heart sounds. No murmur heard. Pulmonary:      Effort: Pulmonary effort is normal. No respiratory distress. Breath sounds: Normal breath sounds. No wheezing or rales. Abdominal:      General: Bowel sounds are normal. There is no distension. Palpations: Abdomen is soft. Tenderness: There is no abdominal tenderness. Musculoskeletal:         General: Normal range of motion. Cervical back: Normal range of motion and neck supple. Right lower leg: No edema. Left lower leg: No edema. Skin:     General: Skin is warm and dry. Capillary Refill: Capillary refill takes less than 2 seconds. Neurological:      Mental Status: He is alert and oriented to person, place, and time.       Coordination: Coordination normal.   Psychiatric:         Behavior: Behavior normal.       Wt Readings from Last 3 Encounters:   03/06/23 225 lb (102.1 kg)   01/12/23 225 lb 9.6 oz (102.3 kg)   08/08/22 226 lb 3.2 oz (102.6 kg)     BP Readings from Last 3 Encounters:   03/06/23 120/60   01/12/23 115/66   08/08/22 129/73     Pulse Readings from Last 3 Encounters:   03/06/23 66   01/12/23 71   08/08/22 69     Body mass index is 34.21 kg/m². ECHO:   8/4/22      Summary   Ejection fraction is visually estimated at 30%. There was moderate global hypokinesis of the left ventricle. Left Ventricular size is Mildly increased . Moderately dilated left atrium. No thrombi   Aortic valve appears tricuspid. Mild aortic regurgitation is noted. Moderate tricuspid regurgitation visualized. Moderate , layered and protruding , immobile plaque noted in the   transverse and descending aorta. Signature      ----------------------------------------------------------------   Electronically signed by Shelli Trejo MD (Interpreting   physician) on 05/02/2022 at 06:19 PM   ----------------------------------------------------------------      CATH/STRESS:   HEMODYNAMIC RESULTS:  LEFT VENTRICULOGRAM:  Left ventricular end-diastolic pressure was 27  mmHg with no significant change before and after contrast injection. No  significant gradient across the aortic valve to signify aortic stenosis. Left ventricular function was abnormal with dilated left ventricle, EF  of 25%. RIGHT HEART CATHETERIZATION:  Right heart catheterization revealed  elevated right ventricular pressure of 55 mmHg. Pulmonary arterial  pressure was difficult to obtain due to enlargement of the right  ventricle and difficulty getting the [place] to advance to the  pulmonary artery. CORONARY ARTERIOGRAM RESULTS:  1. Left main is patent, gives rise to left anterior descending and left  circumflex artery. 2.  Left anterior descending artery has minimal luminal irregularity. 3.  Left circumflex artery is codominant and patent. 4.  Right coronary artery is codominant, has nonobstructive 30 to 40%  proximal stenosis. CONCLUSION:  1. Mild nonobstructive coronary disease. 2.  Cardiomyopathy, likely explaining the patient's symptoms. 3.  Elevated filling pressures. 4.  No complications. At this point, we will continue with diuresis.   We will add beta blocker  and ACE inhibitors. We will consider Entresto and will plan on a  LifeVest and go from there. The patient will need DORINDA cardioversion  next week. We will discuss treatment plan with the family. Results reviewed:  BNP:   Lab Results   Component Value Date     08/08/2022     CBC:   Lab Results   Component Value Date/Time    WBC 5.7 04/27/2022 05:32 AM    RBC 5.58 04/27/2022 05:32 AM    HGB 16.5 04/27/2022 05:32 AM    HCT 52.3 04/27/2022 05:32 AM     04/27/2022 05:32 AM     CMP:    Lab Results   Component Value Date/Time     08/08/2022 12:00 AM    K 3.9 08/08/2022 12:00 AM     08/08/2022 12:00 AM    CO2 30 08/08/2022 12:00 AM    BUN 32 08/08/2022 12:00 AM    CREATININE 0.9 08/08/2022 12:00 AM    LABGLOM 82 08/08/2022 12:00 AM    LABGLOM 73 04/27/2022 05:32 AM    GLUCOSE 142 08/08/2022 12:00 AM    CALCIUM 9.4 08/08/2022 12:00 AM     Hepatic Function Panel:  No results found for: ALKPHOS, ALT, AST, PROT, BILITOT, BILIDIR, IBILI, LABALBU  Magnesium:    Lab Results   Component Value Date/Time    MG 1.9 08/08/2022 12:00 AM     PT/INR:    Lab Results   Component Value Date/Time    INR 1.50 04/27/2022 05:32 AM     Lipids:  No results found for: TRIG, HDL, LDLCALC, LDLDIRECT, LABVLDL    ASSESSMENT AND PLAN:   The patient's condition/symptoms are stable        Diagnosis Orders   1. improved Nonischemic cardiomyopathy ([address]  Basic Metabolic Panel    Magnesium    Brain Natriuretic Peptide      2. Chronic diastolic congestive heart failure, NYHA class 2 (HCC)  Basic Metabolic Panel    Magnesium    Brain Natriuretic Peptide      3. Paroxysmal atrial fibrillation (HCC)        4. At risk for fluid volume overload          Continue:  GDMT:   ACE/ARB/ARNI - Entresto 24/26 BID   BB - Coreg 6.25 BID   Diuretic - Bumex 2mg Daily  AA - none  SGLT2 -  Jardiance 25 daily  Vasodilator - none  Other - KCL 20 BID Eliquis.        HFimpEF 50% 8/2022 (30% 5/2022)  Improved nonischemic cardiomyopathy - tachy induced  Grade 1 DD   Stable, no fluid on exam today. Responding well to his Bumex. Continue w/ current medications and low Na diet. GDMT: not on Aldactone, pt does not want at this time. Afib on eliquis - stable no changes     Lab reviewed - K 3.9, Cr 0.9 mag 1.9     ECHO 2022 Mild MR, mod dilation of left atrium   CATH 2022 Mild CAD, LVEDP 27    Repeat blood work with in the next week or so    No med changes today    Continue diet/fluid adherence  Continue daily wts. F/U w/ Cardiology  F/U in clinic in 6 months      Tolerating above noted HF meds, no ill side effects noted. Will continue to monitor kidney function and electrolytes. Will optimize as tolerated. Pt is compliant w/ medications. Total visit time of 25 minutes has been spent with patient on education of symptoms, management, medication, and plan of care; as well as review of chart: labs, ECHO, radiology reports, etc.   I personally spent more then 50% of the appt time face to face with the patient. Daily weights  Fluid restriction of 2 Liters per day  Limit sodium in diet to around 5397-8239 mg/day  Monitor BP  Activity as tolerated     Patient was instructed to call the [address] for any changes in symptoms as noted in AVS.      Return in about 6 months (around 9/6/2023). or sooner if needed     Patient given educational materials - see patient instructions. We discussed the importance of weighing oneself and recording daily. We also discussed the importance of a low sodium diet, higher sodium foods to avoid and better low sodium food options. Patient verbalizes understanding of plan of care using teach back method, and is agreeable to the treatment plan.        Electronically signed by TAI Lopez CNP on 3/6/2023 at 11:35 AM

## 2023-03-06 NOTE — PATIENT INSTRUCTIONS
You may receive a survey regarding the care you received during your visit. Your input is valuable to us. We encourage you to complete and return your survey. We hope you will choose us in the future for your healthcare needs. Continue:  Continue current medications  Daily weights and record  Fluid restriction of 2 Liters per day  Limit sodium in diet to around 1055-0030 mg/day  Monitor BP  Activity as tolerated     Call the Heart Failure Clinic for any of the following symptoms: [phone]  Weight gain of 2-3 pounds in 1 day or 5 pounds in 1 week  Increased shortness of breath  Shortness of breath while laying down  Cough  Chest pain  Swelling in feet, ankles or legs  Tenderness or bloating in the abdomen  Fatigue   Decreased appetite or nausea   Confusion          Repeat blood work with in the next week or so    No med changes today    Continue diet/fluid adherence  Continue daily wts.   F/U w/ Cardiology  F/U in clinic in 6 months

## 2023-03-07 RX ORDER — CARVEDILOL 6.25 MG/1
6.25 TABLET ORAL 2 TIMES DAILY
Qty: 180 TABLET | Refills: 3 | Status: SHIPPED | OUTPATIENT
Start: 2023-03-07

## 2023-03-07 RX ORDER — POTASSIUM CHLORIDE 20 MEQ/1
20 TABLET, EXTENDED RELEASE ORAL 2 TIMES DAILY
Qty: 180 TABLET | Refills: 3 | Status: SHIPPED | OUTPATIENT
Start: 2023-03-07

## 2023-03-07 RX ORDER — BUMETANIDE 2 MG/1
2 TABLET ORAL DAILY
Qty: 90 TABLET | Refills: 3 | Status: SHIPPED | OUTPATIENT
Start: 2023-03-07

## 2023-09-05 ENCOUNTER — OFFICE VISIT (OUTPATIENT)
Dept: CARDIOLOGY CLINIC | Age: 78
End: 2023-09-05
Payer: MEDICARE

## 2023-09-05 VITALS
DIASTOLIC BLOOD PRESSURE: 70 MMHG | WEIGHT: 226.6 LBS | OXYGEN SATURATION: 97 % | HEART RATE: 63 BPM | SYSTOLIC BLOOD PRESSURE: 138 MMHG | BODY MASS INDEX: 34.34 KG/M2 | HEIGHT: 68 IN

## 2023-09-05 DIAGNOSIS — Z91.89 AT RISK FOR FLUID VOLUME OVERLOAD: ICD-10-CM

## 2023-09-05 DIAGNOSIS — I48.0 PAROXYSMAL ATRIAL FIBRILLATION (HCC): ICD-10-CM

## 2023-09-05 DIAGNOSIS — I50.32 CHRONIC DIASTOLIC CONGESTIVE HEART FAILURE, NYHA CLASS 2 (HCC): ICD-10-CM

## 2023-09-05 DIAGNOSIS — I42.8 NONISCHEMIC CARDIOMYOPATHY (HCC): Primary | ICD-10-CM

## 2023-09-05 PROCEDURE — G8417 CALC BMI ABV UP PARAM F/U: HCPCS | Performed by: NURSE PRACTITIONER

## 2023-09-05 PROCEDURE — 1036F TOBACCO NON-USER: CPT | Performed by: NURSE PRACTITIONER

## 2023-09-05 PROCEDURE — G8427 DOCREV CUR MEDS BY ELIG CLIN: HCPCS | Performed by: NURSE PRACTITIONER

## 2023-09-05 PROCEDURE — 3078F DIAST BP <80 MM HG: CPT | Performed by: NURSE PRACTITIONER

## 2023-09-05 PROCEDURE — 99214 OFFICE O/P EST MOD 30 MIN: CPT | Performed by: NURSE PRACTITIONER

## 2023-09-05 PROCEDURE — 3075F SYST BP GE 130 - 139MM HG: CPT | Performed by: NURSE PRACTITIONER

## 2023-09-05 PROCEDURE — 1123F ACP DISCUSS/DSCN MKR DOCD: CPT | Performed by: NURSE PRACTITIONER

## 2023-09-05 ASSESSMENT — ENCOUNTER SYMPTOMS
NAUSEA: 0
COUGH: 0
SHORTNESS OF BREATH: 0
ABDOMINAL DISTENTION: 0
VOMITING: 0

## 2023-09-05 NOTE — PATIENT INSTRUCTIONS
You may receive a survey regarding the care you received during your visit. Your input is valuable to us. We encourage you to complete and return your survey. We hope you will choose us in the future for your healthcare needs. Your nurses today were Jyothi and [employer].   Office hours:   Mon-Thurs 8-4:30  Friday 8-12  Phone: [phone]    Continue:  Continue current medications  Daily weights and record  Fluid restriction of 2 Liters per day  Limit sodium in diet to around 5157-5937 mg/day  Monitor BP  Activity as tolerated     Call the [address] for any of the following symptoms:   Weight gain of 2-3 pounds in 1 day or 5 pounds in 1 week  Increased shortness of breath  Shortness of breath while laying down  Cough  Chest pain  Swelling in feet, ankles or legs  Bloating in abdomen  Fatigue

## 2023-09-11 LAB
BUN BLDV-MCNC: 19 MG/DL
CALCIUM SERPL-MCNC: 9.5 MG/DL
CHLORIDE BLD-SCNC: 101 MMOL/L
CO2: 28 MMOL/L
CREAT SERPL-MCNC: 0.8 MG/DL
EGFR: 86
GLUCOSE BLD-MCNC: 308 MG/DL
MAGNESIUM: 2.1 MG/DL
POTASSIUM SERPL-SCNC: 3.8 MMOL/L
SODIUM BLD-SCNC: 141 MMOL/L

## 2024-01-11 ENCOUNTER — OFFICE VISIT (OUTPATIENT)
Dept: CARDIOLOGY CLINIC | Age: 79
End: 2024-01-11

## 2024-01-11 VITALS
WEIGHT: 232.4 LBS | DIASTOLIC BLOOD PRESSURE: 70 MMHG | HEIGHT: 68 IN | HEART RATE: 76 BPM | SYSTOLIC BLOOD PRESSURE: 144 MMHG | BODY MASS INDEX: 35.22 KG/M2

## 2024-01-11 DIAGNOSIS — I42.9 CARDIOMYOPATHY, UNSPECIFIED TYPE (HCC): ICD-10-CM

## 2024-01-11 DIAGNOSIS — I10 PRIMARY HYPERTENSION: ICD-10-CM

## 2024-01-11 DIAGNOSIS — I42.0 DILATED CARDIOMYOPATHY (HCC): Primary | ICD-10-CM

## 2024-01-11 DIAGNOSIS — I25.10 CORONARY ARTERY DISEASE INVOLVING NATIVE CORONARY ARTERY OF NATIVE HEART WITHOUT ANGINA PECTORIS: ICD-10-CM

## 2024-01-11 NOTE — PROGRESS NOTES
[facility] [place] [facility]  [facility] CARDIOLOGY  [address]  [address]  [place] [address]  Dept: [phone]  Dept Fax: [phone]  Loc: [phone]    Visit Date: 2024    Marty Linda is a 78 y.o. 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, left       Past Surgical History:   Procedure Laterality Date    ARM SURGERY Left     cancer removal squamous    BACK SURGERY      COLONOSCOPY  , ,,    EGD      SKIN BIOPSY Left     UPPER GASTROINTESTINAL ENDOSCOPY       Family History   Problem Relation Age of Onset    Heart Disease Mother     Heart Disease Father     Prostate Cancer Father     Stroke Maternal Grandfather     Cancer Paternal Grandmother     Cancer Paternal Grandfather     Colon Cancer Neg Hx      Social History     Tobacco Use    Smoking status: Former     Current packs/day: 0.00     Types: Cigarettes     Quit date: 1970     Years since quittin.0    Smokeless tobacco: Never   Substance Use Topics    Alcohol use: Not Currently      Current Outpatient Medications   Medication Sig Dispense Refill    apixaban (ELIQUIS) 5 MG TABS tablet Take 1 tablet by mouth 2 times daily 180 tablet 3    sacubitril-valsartan (ENTRESTO) 24-26 MG per tablet Take 1 tablet by mouth 2 times daily 180 tablet 3    potassium chloride (KLOR-CON M20) 20 MEQ extended release tablet Take 1 tablet by mouth 2 times daily 180 tablet 3    carvedilol (COREG) 6.25 MG tablet Take

## 2024-01-23 ENCOUNTER — TELEPHONE (OUTPATIENT)
Dept: CARDIOLOGY CLINIC | Age: 79
End: 2024-01-23

## 2024-03-18 ENCOUNTER — OFFICE VISIT (OUTPATIENT)
Dept: CARDIOLOGY CLINIC | Age: 79
End: 2024-03-18
Payer: MEDICARE

## 2024-03-18 VITALS
WEIGHT: 236 LBS | HEIGHT: 68 IN | OXYGEN SATURATION: 95 % | HEART RATE: 79 BPM | DIASTOLIC BLOOD PRESSURE: 70 MMHG | SYSTOLIC BLOOD PRESSURE: 120 MMHG | BODY MASS INDEX: 35.77 KG/M2

## 2024-03-18 DIAGNOSIS — I48.0 PAROXYSMAL ATRIAL FIBRILLATION (HCC): ICD-10-CM

## 2024-03-18 DIAGNOSIS — Z91.89 AT RISK FOR FLUID VOLUME OVERLOAD: ICD-10-CM

## 2024-03-18 DIAGNOSIS — I42.8 NONISCHEMIC CARDIOMYOPATHY (HCC): Primary | ICD-10-CM

## 2024-03-18 DIAGNOSIS — I50.32 CHRONIC DIASTOLIC CONGESTIVE HEART FAILURE, NYHA CLASS 2 (HCC): ICD-10-CM

## 2024-03-18 PROCEDURE — G8417 CALC BMI ABV UP PARAM F/U: HCPCS | Performed by: NURSE PRACTITIONER

## 2024-03-18 PROCEDURE — G8427 DOCREV CUR MEDS BY ELIG CLIN: HCPCS | Performed by: NURSE PRACTITIONER

## 2024-03-18 PROCEDURE — 3074F SYST BP LT 130 MM HG: CPT | Performed by: NURSE PRACTITIONER

## 2024-03-18 PROCEDURE — 1123F ACP DISCUSS/DSCN MKR DOCD: CPT | Performed by: NURSE PRACTITIONER

## 2024-03-18 PROCEDURE — 1036F TOBACCO NON-USER: CPT | Performed by: NURSE PRACTITIONER

## 2024-03-18 PROCEDURE — 99214 OFFICE O/P EST MOD 30 MIN: CPT | Performed by: NURSE PRACTITIONER

## 2024-03-18 PROCEDURE — 3078F DIAST BP <80 MM HG: CPT | Performed by: NURSE PRACTITIONER

## 2024-03-18 PROCEDURE — G8484 FLU IMMUNIZE NO ADMIN: HCPCS | Performed by: NURSE PRACTITIONER

## 2024-03-18 RX ORDER — CARVEDILOL 6.25 MG/1
6.25 TABLET ORAL 2 TIMES DAILY
Qty: 180 TABLET | Refills: 3 | Status: SHIPPED | OUTPATIENT
Start: 2024-03-18

## 2024-03-18 RX ORDER — POTASSIUM CHLORIDE 20 MEQ/1
20 TABLET, EXTENDED RELEASE ORAL 2 TIMES DAILY
Qty: 180 TABLET | Refills: 3 | Status: SHIPPED | OUTPATIENT
Start: 2024-03-18

## 2024-03-18 RX ORDER — BUMETANIDE 2 MG/1
2 TABLET ORAL DAILY
Qty: 90 TABLET | Refills: 3 | Status: SHIPPED | OUTPATIENT
Start: 2024-03-18

## 2024-03-18 ASSESSMENT — ENCOUNTER SYMPTOMS
NAUSEA: 0
VOMITING: 0
COUGH: 0
ABDOMINAL DISTENTION: 0
SHORTNESS OF BREATH: 0

## 2024-03-18 NOTE — PROGRESS NOTES
plan of care; as well as review of chart: labs, ECHO, radiology reports, etc.   I personally spent more then 50% of the appt time face to face with the patient.  Daily weights  Fluid restriction of 2 Liters per day  Limit sodium in diet to around 2994-7475 mg/day  Monitor BP  Activity as tolerated     Patient was instructed to call the Heart Failure Clinic for any changes in symptoms as noted in AVS.      Return in about 6 months (around 9/18/2024). or sooner if needed     Patient given educational materials - see patient instructions.   We discussed the importance of weighing oneself and recording daily. We also discussed the importance of a low sodium diet, higher sodium foods to avoid and better low sodium food options.   Patient verbalizes understanding of plan of care using teach back method, and is agreeable to the treatment plan.       Electronically signed by TAI Rodrigues CNP on 3/18/2024 at 11:37 AM

## 2024-03-18 NOTE — PATIENT INSTRUCTIONS
You may receive a survey regarding the care you received during your visit.  Your input is valuable to us.  We encourage you to complete and return your survey.  We hope you will choose us in the future for your healthcare needs.    Your nurses today were Damian.  Office hours:   Mon-Thurs 8-4:30  Friday 8-12  Phone: [phone]    Continue:  Continue current medications  Daily weights and record  Fluid restriction of 2 Liters per day  Limit sodium in diet to around 7053-5691 mg/day  Monitor BP  Activity as tolerated     Call the Heart Failure Clinic for any of the following symptoms:   Weight gain of 2-3 pounds in 1 day or 5 pounds in 1 week  Increased shortness of breath  Shortness of breath while laying down  Cough  Chest pain  Swelling in feet, ankles or legs  Bloating in abdomen  Fatigue      Repeat blood work with in the next week or so    No med changes today - double bumex and potassium for two days     Continue diet/fluid adherence  Continue daily wts.  F/U w/ Cardiology  F/U in clinic in 6 months

## 2024-03-27 LAB
BUN BLDV-MCNC: 22 MG/DL
CALCIUM SERPL-MCNC: 9.4 MG/DL
CHLORIDE BLD-SCNC: 104 MMOL/L
CO2: 29 MMOL/L
CREAT SERPL-MCNC: 0.7 MG/DL
EGFR: NORMAL
GLUCOSE BLD-MCNC: 169 MG/DL
MAGNESIUM: 2.3 MG/DL
POTASSIUM SERPL-SCNC: 4 MMOL/L
SODIUM BLD-SCNC: 143 MMOL/L

## 2024-03-28 ENCOUNTER — TELEPHONE (OUTPATIENT)
Dept: CARDIOLOGY CLINIC | Age: 79
End: 2024-03-28

## 2024-03-28 NOTE — TELEPHONE ENCOUNTER
----- Message from TAI Rodrigues - CNP sent at 3/28/2024  8:57 AM EDT -----  Labs stable - no further changes

## 2024-09-18 ENCOUNTER — OFFICE VISIT (OUTPATIENT)
Dept: CARDIOLOGY CLINIC | Age: 79
End: 2024-09-18
Payer: MEDICARE

## 2024-09-18 VITALS
OXYGEN SATURATION: 98 % | DIASTOLIC BLOOD PRESSURE: 70 MMHG | WEIGHT: 223 LBS | HEART RATE: 65 BPM | SYSTOLIC BLOOD PRESSURE: 140 MMHG | HEIGHT: 68 IN | BODY MASS INDEX: 33.8 KG/M2

## 2024-09-18 DIAGNOSIS — I48.0 PAROXYSMAL ATRIAL FIBRILLATION (HCC): ICD-10-CM

## 2024-09-18 DIAGNOSIS — I42.8 NONISCHEMIC CARDIOMYOPATHY (HCC): Primary | ICD-10-CM

## 2024-09-18 DIAGNOSIS — I50.32 CHRONIC DIASTOLIC CONGESTIVE HEART FAILURE, NYHA CLASS 2 (HCC): ICD-10-CM

## 2024-09-18 DIAGNOSIS — Z91.89 AT RISK FOR FLUID VOLUME OVERLOAD: ICD-10-CM

## 2024-09-18 PROCEDURE — 1123F ACP DISCUSS/DSCN MKR DOCD: CPT | Performed by: NURSE PRACTITIONER

## 2024-09-18 PROCEDURE — 3077F SYST BP >= 140 MM HG: CPT | Performed by: NURSE PRACTITIONER

## 2024-09-18 PROCEDURE — G8417 CALC BMI ABV UP PARAM F/U: HCPCS | Performed by: NURSE PRACTITIONER

## 2024-09-18 PROCEDURE — 3078F DIAST BP <80 MM HG: CPT | Performed by: NURSE PRACTITIONER

## 2024-09-18 PROCEDURE — 1036F TOBACCO NON-USER: CPT | Performed by: NURSE PRACTITIONER

## 2024-09-18 PROCEDURE — 99214 OFFICE O/P EST MOD 30 MIN: CPT | Performed by: NURSE PRACTITIONER

## 2024-09-18 PROCEDURE — G8427 DOCREV CUR MEDS BY ELIG CLIN: HCPCS | Performed by: NURSE PRACTITIONER

## 2024-09-18 ASSESSMENT — ENCOUNTER SYMPTOMS
ABDOMINAL DISTENTION: 0
NAUSEA: 0
COUGH: 0
VOMITING: 0
SHORTNESS OF BREATH: 0

## 2024-09-24 LAB
BUN BLDV-MCNC: 17 MG/DL
CALCIUM SERPL-MCNC: 9.2 MG/DL
CHLORIDE BLD-SCNC: 99 MMOL/L
CO2: 30 MMOL/L
CREAT SERPL-MCNC: 0.8 MG/DL
EGFR: 85
GLUCOSE BLD-MCNC: 219 MG/DL
POTASSIUM SERPL-SCNC: 4.2 MMOL/L
SODIUM BLD-SCNC: 140 MMOL/L

## 2024-10-16 NOTE — TELEPHONE ENCOUNTER
Marty Linda called requesting a refill on the following medications:  Requested Prescriptions     Pending Prescriptions Disp Refills    apixaban (ELIQUIS) 5 MG TABS tablet 180 tablet 3     Sig: Take 1 tablet by mouth 2 times daily     Pharmacy verified: [facility]      Date of last visit: 1/11/2024  Date of next visit (if applicable): 1/14/2025

## 2025-01-14 ENCOUNTER — OFFICE VISIT (OUTPATIENT)
Dept: CARDIOLOGY CLINIC | Age: 80
End: 2025-01-14
Payer: MEDICARE

## 2025-01-14 VITALS
SYSTOLIC BLOOD PRESSURE: 147 MMHG | BODY MASS INDEX: 33.8 KG/M2 | WEIGHT: 223 LBS | HEART RATE: 80 BPM | HEIGHT: 68 IN | DIASTOLIC BLOOD PRESSURE: 78 MMHG

## 2025-01-14 DIAGNOSIS — I10 PRIMARY HYPERTENSION: Primary | ICD-10-CM

## 2025-01-14 DIAGNOSIS — I50.32 CHRONIC DIASTOLIC CONGESTIVE HEART FAILURE, NYHA CLASS 2 (HCC): ICD-10-CM

## 2025-01-14 DIAGNOSIS — I42.8 NONISCHEMIC CARDIOMYOPATHY (HCC): ICD-10-CM

## 2025-01-14 DIAGNOSIS — E78.01 FAMILIAL HYPERCHOLESTEROLEMIA: ICD-10-CM

## 2025-01-14 DIAGNOSIS — I48.21 PERMANENT ATRIAL FIBRILLATION (HCC): ICD-10-CM

## 2025-01-14 PROCEDURE — 1159F MED LIST DOCD IN RCRD: CPT | Performed by: NUCLEAR MEDICINE

## 2025-01-14 PROCEDURE — 1036F TOBACCO NON-USER: CPT | Performed by: NUCLEAR MEDICINE

## 2025-01-14 PROCEDURE — 3077F SYST BP >= 140 MM HG: CPT | Performed by: NUCLEAR MEDICINE

## 2025-01-14 PROCEDURE — G8417 CALC BMI ABV UP PARAM F/U: HCPCS | Performed by: NUCLEAR MEDICINE

## 2025-01-14 PROCEDURE — 99215 OFFICE O/P EST HI 40 MIN: CPT | Performed by: NUCLEAR MEDICINE

## 2025-01-14 PROCEDURE — G8427 DOCREV CUR MEDS BY ELIG CLIN: HCPCS | Performed by: NUCLEAR MEDICINE

## 2025-01-14 PROCEDURE — 3078F DIAST BP <80 MM HG: CPT | Performed by: NUCLEAR MEDICINE

## 2025-01-14 PROCEDURE — 1123F ACP DISCUSS/DSCN MKR DOCD: CPT | Performed by: NUCLEAR MEDICINE

## 2025-01-14 RX ORDER — POTASSIUM CHLORIDE 1500 MG/1
20 TABLET, EXTENDED RELEASE ORAL 2 TIMES DAILY
Qty: 180 TABLET | Refills: 3 | Status: SHIPPED | OUTPATIENT
Start: 2025-01-14

## 2025-01-14 RX ORDER — CARVEDILOL 6.25 MG/1
6.25 TABLET ORAL 2 TIMES DAILY
Qty: 180 TABLET | Refills: 3 | Status: SHIPPED | OUTPATIENT
Start: 2025-01-14

## 2025-01-14 RX ORDER — BUMETANIDE 2 MG/1
2 TABLET ORAL DAILY
Qty: 90 TABLET | Refills: 3 | Status: SHIPPED | OUTPATIENT
Start: 2025-01-14

## 2025-01-14 NOTE — PROGRESS NOTES
Pt C/O dizziness, some slight swelling,       Pt denies CP, SOB, Headache,  heart palpitations, fatigue  
daily 180 tablet 3    potassium chloride (KLOR-CON M20) 20 MEQ extended release tablet Take 1 tablet by mouth 2 times daily 180 tablet 3    sacubitril-valsartan (ENTRESTO) 24-26 MG per tablet Take 1 tablet by mouth 2 times daily 180 tablet 3    meclizine (ANTIVERT) 25 MG tablet       aspirin 81 MG EC tablet Take 1 tablet by mouth daily      empagliflozin (JARDIANCE) 25 MG tablet Take 1 tablet by mouth daily      simvastatin (ZOCOR) 40 MG tablet Take 1 tablet by mouth nightly      NONFORMULARY 1 capsule daily Indications: TriFlex      metFORMIN (GLUCOPHAGE-XR) 500 MG extended release tablet Take 2 tablets by mouth in the morning and at bedtime       No current facility-administered medications for this visit.     No Known Allergies  Health Maintenance   Topic Date Due    Lipids  Never done    Depression Screen  Never done    Diabetic Alb to Cr ratio (uACR) test  Never done    Hepatitis C screen  Never done    Pneumococcal 65+ years Vaccine (2 of 2 - PPSV23 or PCV20) 01/18/2016    Annual Wellness Visit (Medicare)  Never done    GFR test (Diabetes, CKD 3-4, OR last GFR 15-59)  09/24/2025    DTaP/Tdap/Td vaccine (3 - Td or Tdap) 07/29/2034    Flu vaccine  Completed    Shingles vaccine  Completed    COVID-19 Vaccine  Completed    Respiratory Syncytial Virus (RSV) Pregnant or age 60 yrs+  Completed    Hepatitis A vaccine  Aged Out    Hepatitis B vaccine  Aged Out    Hib vaccine  Aged Out    Polio vaccine  Aged Out    Meningococcal (ACWY) vaccine  Aged Out    Colorectal Cancer Screen  Discontinued       Subjective:  General:   No fever, no chills, some fatigue or weight loss  Pulmonary:    No dyspnea, no wheezing  Cardiac:    Denies recent chest pain,   GI:     No nausea or vomiting, no abdominal pain  Neuro:    some dizziness or light headedness,   Musculoskeletal:  No recent active issues  Extremities:   No edema, no obvious claudication       Objective:  General:   Well developed, well nourished  Lungs:   Clear to

## 2025-07-30 ENCOUNTER — OFFICE VISIT (OUTPATIENT)
Dept: CARDIOLOGY CLINIC | Age: 80
End: 2025-07-30
Payer: MEDICARE

## 2025-07-30 VITALS
OXYGEN SATURATION: 98 % | BODY MASS INDEX: 34.15 KG/M2 | HEART RATE: 67 BPM | WEIGHT: 224.6 LBS | SYSTOLIC BLOOD PRESSURE: 150 MMHG | DIASTOLIC BLOOD PRESSURE: 80 MMHG

## 2025-07-30 DIAGNOSIS — I48.19 ATRIAL FIBRILLATION, PERSISTENT (HCC): ICD-10-CM

## 2025-07-30 DIAGNOSIS — R42 VERTIGO: ICD-10-CM

## 2025-07-30 DIAGNOSIS — I50.22 CHF (CONGESTIVE HEART FAILURE), NYHA CLASS II, CHRONIC, SYSTOLIC (HCC): Primary | ICD-10-CM

## 2025-07-30 PROCEDURE — 3077F SYST BP >= 140 MM HG: CPT | Performed by: NURSE PRACTITIONER

## 2025-07-30 PROCEDURE — G8417 CALC BMI ABV UP PARAM F/U: HCPCS | Performed by: NURSE PRACTITIONER

## 2025-07-30 PROCEDURE — 1159F MED LIST DOCD IN RCRD: CPT | Performed by: NURSE PRACTITIONER

## 2025-07-30 PROCEDURE — 99214 OFFICE O/P EST MOD 30 MIN: CPT | Performed by: NURSE PRACTITIONER

## 2025-07-30 PROCEDURE — 1123F ACP DISCUSS/DSCN MKR DOCD: CPT | Performed by: NURSE PRACTITIONER

## 2025-07-30 PROCEDURE — 3079F DIAST BP 80-89 MM HG: CPT | Performed by: NURSE PRACTITIONER

## 2025-07-30 PROCEDURE — G8427 DOCREV CUR MEDS BY ELIG CLIN: HCPCS | Performed by: NURSE PRACTITIONER

## 2025-07-30 PROCEDURE — 1036F TOBACCO NON-USER: CPT | Performed by: NURSE PRACTITIONER

## 2025-07-30 ASSESSMENT — ENCOUNTER SYMPTOMS
SHORTNESS OF BREATH: 0
ABDOMINAL DISTENTION: 0
COUGH: 0
NAUSEA: 0
VOMITING: 0

## 2025-07-30 NOTE — PROGRESS NOTES
Heart Failure Clinic       Visit Date: 7/30/2025  Cardiologist:  Dr. Felix  Primary Care Physician: Brayan Riddle MD    Marty Linda is a 80 y.o. male who presents today for:  Chief Complaint   Patient presents with    Check-Up     CHF       HPI:     TYPE HF: HFimpEF 55% 2022 (HFrEF 30% 2022, new)  Cause: improved nonischemic - most likely tachy induced.   Device: none  HX: DM HTN HLD Anemia Afib (Eliquis)  Dry Wt:  unknown (228 on 5/18/22) (224 on 8/8/22) (225 on 3/6/23) (226 on 9/5/23) (236 on 3/18/24) (223 on 9/18/24), 224# on 7/30/2025      Marty Linda is a 80 y.o. male who presents to the office for a f/u patient visit in the heart failure clinic.  Referred by Dr. Felix    Concerns today: seen by Dr Felix 1/14/2025 having vertigo he is a fall risk. Urinating good.  Discussed watchman, has more questions.  Referral to Dr Peguero.      Patient has:  Chest Pain: no no palpations   SOB: no   Orthopnea/PND: no Sleeps on side with 2 pillows    MERLENE: never tested  Edema: no  Fatigue: no  Abdominal bloating: no   Cough: no  Appetite: good     Activity: ADLs performed without limitations, golf's   Diet: eats out 1-2x a week. Does not add salt to food. Spouse cooks from scratch, she does not monitor food labels. Fluid stays under 2L/day    Visit on 9/18/2024: 6 month f/u. Weight is down 13lbs. Good urination on his bumex. No hospital admissions.       Visit on 3/18/24: 6 month f/u. Weight up 10 lbs. No hospitalizations. He admits that over the last couple weeks. He denies leg swelling, bloating, orthopnea, and SOB. Urinating well on his bumex    Visit on 9/5/23: here today for his 6 month f/u. Weight is stable, no CHF hospitalizations in a yr. Doing well over all. Denies SOB, bloating, orthopnea, or lower leg swelling. Urinating well on his Bumex. Following low Na /fluid diet most days.     Visit on 3/6/23: here today for his 6 month f/u. Doing well. Stable weights and no CHF hospitalizations. Urinating well on

## 2025-07-30 NOTE — PATIENT INSTRUCTIONS
You may receive a survey regarding the care you received during your visit.  Your input is valuable to us.  We encourage you to complete and return your survey.  We hope you will choose us in the future for your healthcare needs.    Your nurses today were Yani Cutler and Linda.  Office hours:   Mon-Thurs 8-4:30  Friday 8-12  Phone: [phone]    Continue:  Continue current medications  Daily weights and record  Fluid restriction of 2 Liters per day  Limit sodium in diet to around 3019-9216 mg/day  Monitor BP    Call the Heart Failure Clinic for any of the following symptoms:   Weight gain of 3 pounds in 1 day or 5 pounds in 1 week  Increased shortness of breath  Shortness of breath while laying down  Chest pain  Swelling in feet, ankles or legs  Bloating in abdomen  Fatigue       Repeat blood work with in the next 2 weeks     No med changes today     Continue diet/fluid adherence  Continue daily wts.  F/U w/ Cardiology  F/U in clinic in 1 yr

## 2025-08-07 LAB
BUN BLDV-MCNC: 20 MG/DL
CALCIUM SERPL-MCNC: 9.4 MG/DL
CHLORIDE BLD-SCNC: 103 MMOL/L
CO2: 26 MMOL/L
CREAT SERPL-MCNC: 0.7 MG/DL
EGFR: 89
GLUCOSE BLD-MCNC: 175 MG/DL
MAGNESIUM: 1.9 MG/DL
POTASSIUM SERPL-SCNC: 3.6 MMOL/L
SODIUM BLD-SCNC: 141 MMOL/L

## 2025-08-08 ENCOUNTER — RESULTS FOLLOW-UP (OUTPATIENT)
Dept: CARDIOLOGY CLINIC | Age: 80
End: 2025-08-08

## 2025-08-13 ENCOUNTER — TELEPHONE (OUTPATIENT)
Dept: CARDIOLOGY CLINIC | Age: 80
End: 2025-08-13

## 2025-08-21 ENCOUNTER — OFFICE VISIT (OUTPATIENT)
Dept: CARDIOLOGY CLINIC | Age: 80
End: 2025-08-21
Payer: MEDICARE

## 2025-08-21 ENCOUNTER — TELEPHONE (OUTPATIENT)
Dept: CARDIOLOGY CLINIC | Age: 80
End: 2025-08-21

## 2025-08-21 VITALS
BODY MASS INDEX: 34.71 KG/M2 | HEIGHT: 68 IN | SYSTOLIC BLOOD PRESSURE: 164 MMHG | DIASTOLIC BLOOD PRESSURE: 82 MMHG | WEIGHT: 229 LBS | HEART RATE: 64 BPM

## 2025-08-21 DIAGNOSIS — I48.0 PAROXYSMAL ATRIAL FIBRILLATION (HCC): Primary | ICD-10-CM

## 2025-08-21 PROCEDURE — G8417 CALC BMI ABV UP PARAM F/U: HCPCS | Performed by: INTERNAL MEDICINE

## 2025-08-21 PROCEDURE — 1159F MED LIST DOCD IN RCRD: CPT | Performed by: INTERNAL MEDICINE

## 2025-08-21 PROCEDURE — 1123F ACP DISCUSS/DSCN MKR DOCD: CPT | Performed by: INTERNAL MEDICINE

## 2025-08-21 PROCEDURE — 3079F DIAST BP 80-89 MM HG: CPT | Performed by: INTERNAL MEDICINE

## 2025-08-21 PROCEDURE — 1036F TOBACCO NON-USER: CPT | Performed by: INTERNAL MEDICINE

## 2025-08-21 PROCEDURE — 3077F SYST BP >= 140 MM HG: CPT | Performed by: INTERNAL MEDICINE

## 2025-08-21 PROCEDURE — G8428 CUR MEDS NOT DOCUMENT: HCPCS | Performed by: INTERNAL MEDICINE

## 2025-08-21 PROCEDURE — 99215 OFFICE O/P EST HI 40 MIN: CPT | Performed by: INTERNAL MEDICINE
